# Patient Record
Sex: FEMALE | Race: WHITE
[De-identification: names, ages, dates, MRNs, and addresses within clinical notes are randomized per-mention and may not be internally consistent; named-entity substitution may affect disease eponyms.]

---

## 2020-06-12 ENCOUNTER — HOSPITAL ENCOUNTER (INPATIENT)
Dept: HOSPITAL 90 - EDH | Age: 75
LOS: 14 days | Discharge: NURSING, REHAB OR LONG-TERM CARE | DRG: 720 | End: 2020-06-26
Attending: INTERNAL MEDICINE | Admitting: INTERNAL MEDICINE
Payer: MEDICAID

## 2020-06-12 VITALS — DIASTOLIC BLOOD PRESSURE: 62 MMHG | SYSTOLIC BLOOD PRESSURE: 116 MMHG | HEART RATE: 58 BPM | RESPIRATION RATE: 18 BRPM

## 2020-06-12 VITALS — DIASTOLIC BLOOD PRESSURE: 69 MMHG | RESPIRATION RATE: 17 BRPM | HEART RATE: 75 BPM | SYSTOLIC BLOOD PRESSURE: 129 MMHG

## 2020-06-12 VITALS
DIASTOLIC BLOOD PRESSURE: 64 MMHG | HEART RATE: 61 BPM | RESPIRATION RATE: 18 BRPM | TEMPERATURE: 97.1 F | SYSTOLIC BLOOD PRESSURE: 105 MMHG

## 2020-06-12 VITALS — RESPIRATION RATE: 21 BRPM | DIASTOLIC BLOOD PRESSURE: 75 MMHG | SYSTOLIC BLOOD PRESSURE: 129 MMHG | HEART RATE: 75 BPM

## 2020-06-12 VITALS — RESPIRATION RATE: 11 BRPM | SYSTOLIC BLOOD PRESSURE: 129 MMHG | HEART RATE: 68 BPM | DIASTOLIC BLOOD PRESSURE: 65 MMHG

## 2020-06-12 VITALS — SYSTOLIC BLOOD PRESSURE: 109 MMHG | RESPIRATION RATE: 18 BRPM | DIASTOLIC BLOOD PRESSURE: 69 MMHG | HEART RATE: 71 BPM

## 2020-06-12 VITALS — SYSTOLIC BLOOD PRESSURE: 119 MMHG | DIASTOLIC BLOOD PRESSURE: 55 MMHG | RESPIRATION RATE: 15 BRPM | HEART RATE: 68 BPM

## 2020-06-12 VITALS — DIASTOLIC BLOOD PRESSURE: 58 MMHG | RESPIRATION RATE: 18 BRPM | HEART RATE: 73 BPM | SYSTOLIC BLOOD PRESSURE: 124 MMHG

## 2020-06-12 VITALS — RESPIRATION RATE: 18 BRPM | SYSTOLIC BLOOD PRESSURE: 127 MMHG | HEART RATE: 60 BPM | DIASTOLIC BLOOD PRESSURE: 67 MMHG

## 2020-06-12 VITALS — RESPIRATION RATE: 18 BRPM | DIASTOLIC BLOOD PRESSURE: 54 MMHG | HEART RATE: 75 BPM | SYSTOLIC BLOOD PRESSURE: 99 MMHG

## 2020-06-12 VITALS — RESPIRATION RATE: 21 BRPM | SYSTOLIC BLOOD PRESSURE: 135 MMHG | HEART RATE: 59 BPM | DIASTOLIC BLOOD PRESSURE: 61 MMHG

## 2020-06-12 VITALS — BODY MASS INDEX: 24.18 KG/M2 | HEIGHT: 65 IN | WEIGHT: 145.1 LBS

## 2020-06-12 VITALS — HEART RATE: 60 BPM | SYSTOLIC BLOOD PRESSURE: 121 MMHG | DIASTOLIC BLOOD PRESSURE: 71 MMHG | RESPIRATION RATE: 18 BRPM

## 2020-06-12 VITALS
TEMPERATURE: 97.5 F | SYSTOLIC BLOOD PRESSURE: 123 MMHG | DIASTOLIC BLOOD PRESSURE: 65 MMHG | HEART RATE: 67 BPM | RESPIRATION RATE: 21 BRPM

## 2020-06-12 VITALS — HEART RATE: 72 BPM

## 2020-06-12 VITALS — HEART RATE: 84 BPM | SYSTOLIC BLOOD PRESSURE: 204 MMHG | DIASTOLIC BLOOD PRESSURE: 91 MMHG | RESPIRATION RATE: 19 BRPM

## 2020-06-12 VITALS — RESPIRATION RATE: 18 BRPM | SYSTOLIC BLOOD PRESSURE: 93 MMHG | DIASTOLIC BLOOD PRESSURE: 50 MMHG | HEART RATE: 87 BPM

## 2020-06-12 VITALS — HEART RATE: 64 BPM | SYSTOLIC BLOOD PRESSURE: 122 MMHG | DIASTOLIC BLOOD PRESSURE: 63 MMHG | RESPIRATION RATE: 19 BRPM

## 2020-06-12 VITALS — DIASTOLIC BLOOD PRESSURE: 67 MMHG | SYSTOLIC BLOOD PRESSURE: 114 MMHG | HEART RATE: 70 BPM | RESPIRATION RATE: 18 BRPM

## 2020-06-12 VITALS — SYSTOLIC BLOOD PRESSURE: 117 MMHG | HEART RATE: 88 BPM | DIASTOLIC BLOOD PRESSURE: 48 MMHG | RESPIRATION RATE: 18 BRPM

## 2020-06-12 VITALS — HEART RATE: 64 BPM

## 2020-06-12 VITALS — DIASTOLIC BLOOD PRESSURE: 69 MMHG | RESPIRATION RATE: 18 BRPM | HEART RATE: 58 BPM | SYSTOLIC BLOOD PRESSURE: 128 MMHG

## 2020-06-12 VITALS — TEMPERATURE: 99.8 F

## 2020-06-12 VITALS — SYSTOLIC BLOOD PRESSURE: 101 MMHG | DIASTOLIC BLOOD PRESSURE: 54 MMHG | RESPIRATION RATE: 18 BRPM | HEART RATE: 71 BPM

## 2020-06-12 VITALS — HEART RATE: 73 BPM

## 2020-06-12 VITALS — DIASTOLIC BLOOD PRESSURE: 56 MMHG | HEART RATE: 69 BPM | SYSTOLIC BLOOD PRESSURE: 103 MMHG | RESPIRATION RATE: 18 BRPM

## 2020-06-12 VITALS — RESPIRATION RATE: 18 BRPM | HEART RATE: 67 BPM | DIASTOLIC BLOOD PRESSURE: 68 MMHG | SYSTOLIC BLOOD PRESSURE: 95 MMHG

## 2020-06-12 VITALS — RESPIRATION RATE: 21 BRPM | HEART RATE: 74 BPM

## 2020-06-12 VITALS — TEMPERATURE: 97.5 F

## 2020-06-12 VITALS — HEART RATE: 68 BPM | RESPIRATION RATE: 21 BRPM | SYSTOLIC BLOOD PRESSURE: 117 MMHG | DIASTOLIC BLOOD PRESSURE: 42 MMHG

## 2020-06-12 VITALS — TEMPERATURE: 99.5 F

## 2020-06-12 VITALS — HEART RATE: 78 BPM

## 2020-06-12 VITALS — HEART RATE: 71 BPM

## 2020-06-12 VITALS — HEART RATE: 65 BPM

## 2020-06-12 VITALS — HEART RATE: 70 BPM

## 2020-06-12 DIAGNOSIS — B96.89: ICD-10-CM

## 2020-06-12 DIAGNOSIS — R53.81: ICD-10-CM

## 2020-06-12 DIAGNOSIS — J93.9: ICD-10-CM

## 2020-06-12 DIAGNOSIS — Z91.048: ICD-10-CM

## 2020-06-12 DIAGNOSIS — N19: ICD-10-CM

## 2020-06-12 DIAGNOSIS — A41.9: Primary | ICD-10-CM

## 2020-06-12 DIAGNOSIS — Z88.1: ICD-10-CM

## 2020-06-12 DIAGNOSIS — Z20.828: ICD-10-CM

## 2020-06-12 DIAGNOSIS — E03.9: ICD-10-CM

## 2020-06-12 DIAGNOSIS — E87.3: ICD-10-CM

## 2020-06-12 DIAGNOSIS — K31.4: ICD-10-CM

## 2020-06-12 DIAGNOSIS — Z66: ICD-10-CM

## 2020-06-12 DIAGNOSIS — E87.6: ICD-10-CM

## 2020-06-12 DIAGNOSIS — E66.9: ICD-10-CM
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
## 2020-06-12 DIAGNOSIS — G93.1: ICD-10-CM

## 2020-06-12 DIAGNOSIS — J44.9: ICD-10-CM

## 2020-06-12 DIAGNOSIS — J69.0: ICD-10-CM

## 2020-06-12 DIAGNOSIS — M40.209: ICD-10-CM

## 2020-06-12 DIAGNOSIS — F34.1: ICD-10-CM

## 2020-06-12 DIAGNOSIS — E04.1: ICD-10-CM

## 2020-06-12 DIAGNOSIS — I11.0: ICD-10-CM

## 2020-06-12 DIAGNOSIS — E44.0: ICD-10-CM

## 2020-06-12 DIAGNOSIS — F03.90: ICD-10-CM

## 2020-06-12 DIAGNOSIS — R65.21: ICD-10-CM

## 2020-06-12 DIAGNOSIS — J96.22: ICD-10-CM

## 2020-06-12 DIAGNOSIS — N39.0: ICD-10-CM

## 2020-06-12 DIAGNOSIS — D64.9: ICD-10-CM

## 2020-06-12 DIAGNOSIS — T79.7XXA: ICD-10-CM

## 2020-06-12 DIAGNOSIS — Z79.899: ICD-10-CM

## 2020-06-12 DIAGNOSIS — I50.33: ICD-10-CM

## 2020-06-12 DIAGNOSIS — Z88.8: ICD-10-CM

## 2020-06-12 DIAGNOSIS — D72.823: ICD-10-CM

## 2020-06-12 DIAGNOSIS — J96.21: ICD-10-CM

## 2020-06-12 DIAGNOSIS — Z74.01: ICD-10-CM

## 2020-06-12 DIAGNOSIS — E87.0: ICD-10-CM

## 2020-06-12 DIAGNOSIS — I46.9: ICD-10-CM

## 2020-06-12 DIAGNOSIS — G47.00: ICD-10-CM

## 2020-06-12 PROCEDURE — 5A1955Z RESPIRATORY VENTILATION, GREATER THAN 96 CONSECUTIVE HOURS: ICD-10-PCS | Performed by: INTERNAL MEDICINE

## 2020-06-12 PROCEDURE — 0W9930Z DRAINAGE OF RIGHT PLEURAL CAVITY WITH DRAINAGE DEVICE, PERCUTANEOUS APPROACH: ICD-10-PCS | Performed by: INTERNAL MEDICINE

## 2020-06-12 PROCEDURE — 5A12012 PERFORMANCE OF CARDIAC OUTPUT, SINGLE, MANUAL: ICD-10-PCS | Performed by: INTERNAL MEDICINE

## 2020-06-12 PROCEDURE — 0BH17EZ INSERTION OF ENDOTRACHEAL AIRWAY INTO TRACHEA, VIA NATURAL OR ARTIFICIAL OPENING: ICD-10-PCS | Performed by: INTERNAL MEDICINE

## 2020-06-12 RX ADMIN — PIPERACILLIN SODIUM AND TAZOBACTAM SODIUM SCH MLS/HR: .375; 3 INJECTION, POWDER, LYOPHILIZED, FOR SOLUTION INTRAVENOUS at 18:12

## 2020-06-12 RX ADMIN — FAMOTIDINE SCH MG: 10 INJECTION INTRAVENOUS at 22:10

## 2020-06-12 RX ADMIN — Medication PRN MG: at 19:35

## 2020-06-12 RX ADMIN — SODIUM CHLORIDE SCH MLS/HR: 0.9 INJECTION, SOLUTION INTRAVENOUS at 10:12

## 2020-06-12 RX ADMIN — VANCOMYCIN HYDROCHLORIDE SCH MLS/HR: 1 INJECTION, POWDER, LYOPHILIZED, FOR SOLUTION INTRAVENOUS at 23:07

## 2020-06-12 RX ADMIN — SODIUM CHLORIDE SCH UNIT: 9 INJECTION, SOLUTION INTRAVENOUS at 20:43

## 2020-06-12 RX ADMIN — PIPERACILLIN SODIUM AND TAZOBACTAM SODIUM SCH MLS/HR: .375; 3 INJECTION, POWDER, LYOPHILIZED, FOR SOLUTION INTRAVENOUS at 10:12

## 2020-06-12 RX ADMIN — HEPARIN SODIUM SCH UNIT: 5000 INJECTION, SOLUTION INTRAVENOUS; SUBCUTANEOUS at 22:11

## 2020-06-12 RX ADMIN — HEPARIN SODIUM SCH UNIT: 5000 INJECTION, SOLUTION INTRAVENOUS; SUBCUTANEOUS at 10:13

## 2020-06-12 RX ADMIN — VANCOMYCIN HYDROCHLORIDE SCH MLS/HR: 1 INJECTION, POWDER, LYOPHILIZED, FOR SOLUTION INTRAVENOUS at 15:05

## 2020-06-12 RX ADMIN — SODIUM CHLORIDE SCH UNIT: 9 INJECTION, SOLUTION INTRAVENOUS at 16:30

## 2020-06-12 NOTE — NUR
PATIENT HAS ARRIVED FROM ER DEPARTMENT. PATIENT INTUBATED, HAMILTON CATHETER INTACT, LEFT AND 
RIGHT CHEST TUBES INTACT. PATIENT SEDATED ON FENTANYL AND PROPOFOL IV DRIPS. PATIENT 
RESPONDS TO PAINFUL STIMULATION.

## 2020-06-13 VITALS — TEMPERATURE: 99.8 F

## 2020-06-13 VITALS
DIASTOLIC BLOOD PRESSURE: 59 MMHG | TEMPERATURE: 99.7 F | SYSTOLIC BLOOD PRESSURE: 113 MMHG | RESPIRATION RATE: 18 BRPM | HEART RATE: 88 BPM

## 2020-06-13 VITALS — SYSTOLIC BLOOD PRESSURE: 110 MMHG | DIASTOLIC BLOOD PRESSURE: 54 MMHG | RESPIRATION RATE: 18 BRPM | HEART RATE: 92 BPM

## 2020-06-13 VITALS — SYSTOLIC BLOOD PRESSURE: 100 MMHG | RESPIRATION RATE: 18 BRPM | HEART RATE: 81 BPM | DIASTOLIC BLOOD PRESSURE: 58 MMHG

## 2020-06-13 VITALS — RESPIRATION RATE: 14 BRPM | HEART RATE: 73 BPM

## 2020-06-13 VITALS — RESPIRATION RATE: 18 BRPM | SYSTOLIC BLOOD PRESSURE: 103 MMHG | DIASTOLIC BLOOD PRESSURE: 53 MMHG | HEART RATE: 93 BPM

## 2020-06-13 VITALS — HEART RATE: 102 BPM | SYSTOLIC BLOOD PRESSURE: 104 MMHG | DIASTOLIC BLOOD PRESSURE: 70 MMHG | RESPIRATION RATE: 30 BRPM

## 2020-06-13 VITALS — HEART RATE: 77 BPM

## 2020-06-13 VITALS — DIASTOLIC BLOOD PRESSURE: 49 MMHG | SYSTOLIC BLOOD PRESSURE: 115 MMHG | HEART RATE: 76 BPM

## 2020-06-13 VITALS — RESPIRATION RATE: 14 BRPM | HEART RATE: 98 BPM | SYSTOLIC BLOOD PRESSURE: 111 MMHG | DIASTOLIC BLOOD PRESSURE: 53 MMHG

## 2020-06-13 VITALS — RESPIRATION RATE: 16 BRPM | SYSTOLIC BLOOD PRESSURE: 113 MMHG | DIASTOLIC BLOOD PRESSURE: 46 MMHG | HEART RATE: 91 BPM

## 2020-06-13 VITALS — SYSTOLIC BLOOD PRESSURE: 116 MMHG | HEART RATE: 77 BPM | RESPIRATION RATE: 14 BRPM | DIASTOLIC BLOOD PRESSURE: 59 MMHG

## 2020-06-13 VITALS — DIASTOLIC BLOOD PRESSURE: 59 MMHG | HEART RATE: 91 BPM | RESPIRATION RATE: 15 BRPM | SYSTOLIC BLOOD PRESSURE: 109 MMHG

## 2020-06-13 VITALS — HEART RATE: 86 BPM | SYSTOLIC BLOOD PRESSURE: 97 MMHG | RESPIRATION RATE: 14 BRPM | DIASTOLIC BLOOD PRESSURE: 58 MMHG

## 2020-06-13 VITALS — RESPIRATION RATE: 18 BRPM | HEART RATE: 88 BPM

## 2020-06-13 VITALS — HEART RATE: 81 BPM | RESPIRATION RATE: 18 BRPM | SYSTOLIC BLOOD PRESSURE: 99 MMHG | DIASTOLIC BLOOD PRESSURE: 52 MMHG

## 2020-06-13 VITALS — SYSTOLIC BLOOD PRESSURE: 94 MMHG | DIASTOLIC BLOOD PRESSURE: 61 MMHG | RESPIRATION RATE: 18 BRPM | HEART RATE: 72 BPM

## 2020-06-13 VITALS — DIASTOLIC BLOOD PRESSURE: 60 MMHG | RESPIRATION RATE: 14 BRPM | HEART RATE: 85 BPM | SYSTOLIC BLOOD PRESSURE: 104 MMHG

## 2020-06-13 VITALS — DIASTOLIC BLOOD PRESSURE: 52 MMHG | HEART RATE: 88 BPM | SYSTOLIC BLOOD PRESSURE: 93 MMHG | RESPIRATION RATE: 14 BRPM

## 2020-06-13 VITALS — SYSTOLIC BLOOD PRESSURE: 103 MMHG | DIASTOLIC BLOOD PRESSURE: 47 MMHG | HEART RATE: 77 BPM | RESPIRATION RATE: 14 BRPM

## 2020-06-13 VITALS — HEART RATE: 75 BPM | RESPIRATION RATE: 14 BRPM

## 2020-06-13 VITALS — SYSTOLIC BLOOD PRESSURE: 121 MMHG | RESPIRATION RATE: 16 BRPM | HEART RATE: 77 BPM | DIASTOLIC BLOOD PRESSURE: 51 MMHG

## 2020-06-13 VITALS — DIASTOLIC BLOOD PRESSURE: 66 MMHG | HEART RATE: 84 BPM | RESPIRATION RATE: 16 BRPM | SYSTOLIC BLOOD PRESSURE: 116 MMHG

## 2020-06-13 VITALS — SYSTOLIC BLOOD PRESSURE: 109 MMHG | DIASTOLIC BLOOD PRESSURE: 57 MMHG | RESPIRATION RATE: 14 BRPM | HEART RATE: 75 BPM

## 2020-06-13 VITALS — SYSTOLIC BLOOD PRESSURE: 115 MMHG | DIASTOLIC BLOOD PRESSURE: 60 MMHG | RESPIRATION RATE: 14 BRPM | HEART RATE: 95 BPM

## 2020-06-13 VITALS — SYSTOLIC BLOOD PRESSURE: 111 MMHG | HEART RATE: 81 BPM | RESPIRATION RATE: 13 BRPM | DIASTOLIC BLOOD PRESSURE: 46 MMHG

## 2020-06-13 VITALS — SYSTOLIC BLOOD PRESSURE: 114 MMHG | HEART RATE: 105 BPM | RESPIRATION RATE: 15 BRPM | DIASTOLIC BLOOD PRESSURE: 85 MMHG

## 2020-06-13 VITALS — SYSTOLIC BLOOD PRESSURE: 116 MMHG | HEART RATE: 91 BPM | DIASTOLIC BLOOD PRESSURE: 63 MMHG | RESPIRATION RATE: 18 BRPM

## 2020-06-13 VITALS — HEART RATE: 89 BPM | DIASTOLIC BLOOD PRESSURE: 52 MMHG | RESPIRATION RATE: 14 BRPM | SYSTOLIC BLOOD PRESSURE: 91 MMHG

## 2020-06-13 VITALS — HEART RATE: 78 BPM

## 2020-06-13 VITALS — HEART RATE: 86 BPM | DIASTOLIC BLOOD PRESSURE: 53 MMHG | SYSTOLIC BLOOD PRESSURE: 94 MMHG | RESPIRATION RATE: 14 BRPM

## 2020-06-13 VITALS — HEART RATE: 90 BPM

## 2020-06-13 VITALS — RESPIRATION RATE: 14 BRPM | SYSTOLIC BLOOD PRESSURE: 115 MMHG | DIASTOLIC BLOOD PRESSURE: 63 MMHG | HEART RATE: 80 BPM

## 2020-06-13 VITALS — HEART RATE: 84 BPM

## 2020-06-13 VITALS — HEART RATE: 89 BPM

## 2020-06-13 VITALS — HEART RATE: 77 BPM | SYSTOLIC BLOOD PRESSURE: 96 MMHG | DIASTOLIC BLOOD PRESSURE: 46 MMHG

## 2020-06-13 VITALS — HEART RATE: 77 BPM | RESPIRATION RATE: 11 BRPM | SYSTOLIC BLOOD PRESSURE: 130 MMHG | DIASTOLIC BLOOD PRESSURE: 53 MMHG

## 2020-06-13 VITALS — DIASTOLIC BLOOD PRESSURE: 63 MMHG | HEART RATE: 77 BPM | RESPIRATION RATE: 15 BRPM | SYSTOLIC BLOOD PRESSURE: 121 MMHG

## 2020-06-13 VITALS — HEART RATE: 76 BPM | DIASTOLIC BLOOD PRESSURE: 44 MMHG | RESPIRATION RATE: 14 BRPM | SYSTOLIC BLOOD PRESSURE: 99 MMHG

## 2020-06-13 VITALS — HEART RATE: 83 BPM

## 2020-06-13 VITALS — HEART RATE: 97 BPM | DIASTOLIC BLOOD PRESSURE: 49 MMHG | SYSTOLIC BLOOD PRESSURE: 101 MMHG

## 2020-06-13 VITALS — HEART RATE: 81 BPM

## 2020-06-13 VITALS — RESPIRATION RATE: 18 BRPM | SYSTOLIC BLOOD PRESSURE: 102 MMHG | HEART RATE: 80 BPM | DIASTOLIC BLOOD PRESSURE: 57 MMHG

## 2020-06-13 VITALS — TEMPERATURE: 97.7 F

## 2020-06-13 VITALS — DIASTOLIC BLOOD PRESSURE: 65 MMHG | HEART RATE: 88 BPM | SYSTOLIC BLOOD PRESSURE: 113 MMHG | RESPIRATION RATE: 18 BRPM

## 2020-06-13 VITALS — DIASTOLIC BLOOD PRESSURE: 47 MMHG | SYSTOLIC BLOOD PRESSURE: 95 MMHG | HEART RATE: 89 BPM | RESPIRATION RATE: 18 BRPM

## 2020-06-13 VITALS — RESPIRATION RATE: 14 BRPM | HEART RATE: 79 BPM

## 2020-06-13 VITALS — HEART RATE: 86 BPM

## 2020-06-13 VITALS — TEMPERATURE: 98.5 F

## 2020-06-13 VITALS — TEMPERATURE: 98.4 F

## 2020-06-13 VITALS — HEART RATE: 74 BPM

## 2020-06-13 VITALS — TEMPERATURE: 99.1 F

## 2020-06-13 VITALS — HEART RATE: 79 BPM

## 2020-06-13 RX ADMIN — IPRATROPIUM BROMIDE AND ALBUTEROL SULFATE PRN UDVIAL: .5; 3 SOLUTION RESPIRATORY (INHALATION) at 06:32

## 2020-06-13 RX ADMIN — Medication PRN MG: at 18:36

## 2020-06-13 RX ADMIN — PIPERACILLIN SODIUM AND TAZOBACTAM SODIUM SCH MLS/HR: .375; 3 INJECTION, POWDER, LYOPHILIZED, FOR SOLUTION INTRAVENOUS at 02:21

## 2020-06-13 RX ADMIN — SODIUM CHLORIDE SCH UNIT: 9 INJECTION, SOLUTION INTRAVENOUS at 19:43

## 2020-06-13 RX ADMIN — IPRATROPIUM BROMIDE AND ALBUTEROL SULFATE PRN UDVIAL: .5; 3 SOLUTION RESPIRATORY (INHALATION) at 18:39

## 2020-06-13 RX ADMIN — SODIUM CHLORIDE SCH MLS/HR: 0.9 INJECTION, SOLUTION INTRAVENOUS at 18:36

## 2020-06-13 RX ADMIN — HEPARIN SODIUM SCH UNIT: 5000 INJECTION, SOLUTION INTRAVENOUS; SUBCUTANEOUS at 21:32

## 2020-06-13 RX ADMIN — PIPERACILLIN SODIUM AND TAZOBACTAM SODIUM SCH MLS/HR: .375; 3 INJECTION, POWDER, LYOPHILIZED, FOR SOLUTION INTRAVENOUS at 10:15

## 2020-06-13 RX ADMIN — SODIUM CHLORIDE SCH UNIT: 9 INJECTION, SOLUTION INTRAVENOUS at 16:30

## 2020-06-13 RX ADMIN — SODIUM CHLORIDE SCH MLS/HR: 0.9 INJECTION, SOLUTION INTRAVENOUS at 06:15

## 2020-06-13 RX ADMIN — HEPARIN SODIUM SCH UNIT: 5000 INJECTION, SOLUTION INTRAVENOUS; SUBCUTANEOUS at 10:16

## 2020-06-13 RX ADMIN — FAMOTIDINE SCH MG: 10 INJECTION INTRAVENOUS at 21:30

## 2020-06-13 RX ADMIN — SODIUM CHLORIDE SCH UNIT: 9 INJECTION, SOLUTION INTRAVENOUS at 11:01

## 2020-06-13 RX ADMIN — FAMOTIDINE SCH MG: 10 INJECTION INTRAVENOUS at 08:38

## 2020-06-13 RX ADMIN — VANCOMYCIN HYDROCHLORIDE SCH MLS/HR: 1 INJECTION, POWDER, LYOPHILIZED, FOR SOLUTION INTRAVENOUS at 11:43

## 2020-06-13 RX ADMIN — PIPERACILLIN SODIUM AND TAZOBACTAM SODIUM SCH MLS/HR: .375; 3 INJECTION, POWDER, LYOPHILIZED, FOR SOLUTION INTRAVENOUS at 17:42

## 2020-06-13 RX ADMIN — IPRATROPIUM BROMIDE AND ALBUTEROL SULFATE PRN UDVIAL: .5; 3 SOLUTION RESPIRATORY (INHALATION) at 11:31

## 2020-06-13 RX ADMIN — SODIUM CHLORIDE SCH UNIT: 9 INJECTION, SOLUTION INTRAVENOUS at 05:58

## 2020-06-13 RX ADMIN — METHYLPREDNISOLONE SODIUM SUCCINATE SCH MG: 40 INJECTION, POWDER, FOR SOLUTION INTRAMUSCULAR; INTRAVENOUS at 08:38

## 2020-06-13 RX ADMIN — IPRATROPIUM BROMIDE AND ALBUTEROL SULFATE PRN UDVIAL: .5; 3 SOLUTION RESPIRATORY (INHALATION) at 18:34

## 2020-06-13 NOTE — NUR
Patient Power of  alejandrina attempted to be contacted

Made 16 attempts to contact family regarding central line placement as patient has poor 
peripheral IV access. Dr. Westbrook at bedside went ahead with emergency central line access 
as family did not answer phone and patient was in need of iv access for sedation, fluids and 
antibiotics.

## 2020-06-14 VITALS — RESPIRATION RATE: 15 BRPM | DIASTOLIC BLOOD PRESSURE: 52 MMHG | SYSTOLIC BLOOD PRESSURE: 124 MMHG | HEART RATE: 79 BPM

## 2020-06-14 VITALS
DIASTOLIC BLOOD PRESSURE: 52 MMHG | RESPIRATION RATE: 11 BRPM | HEART RATE: 80 BPM | TEMPERATURE: 98.6 F | SYSTOLIC BLOOD PRESSURE: 129 MMHG

## 2020-06-14 VITALS — SYSTOLIC BLOOD PRESSURE: 90 MMHG | HEART RATE: 69 BPM | DIASTOLIC BLOOD PRESSURE: 31 MMHG | RESPIRATION RATE: 21 BRPM

## 2020-06-14 VITALS — HEART RATE: 84 BPM

## 2020-06-14 VITALS — HEART RATE: 64 BPM

## 2020-06-14 VITALS — HEART RATE: 65 BPM | RESPIRATION RATE: 16 BRPM | SYSTOLIC BLOOD PRESSURE: 111 MMHG | DIASTOLIC BLOOD PRESSURE: 57 MMHG

## 2020-06-14 VITALS — DIASTOLIC BLOOD PRESSURE: 72 MMHG | RESPIRATION RATE: 15 BRPM | HEART RATE: 78 BPM | SYSTOLIC BLOOD PRESSURE: 113 MMHG

## 2020-06-14 VITALS — RESPIRATION RATE: 14 BRPM | HEART RATE: 84 BPM | SYSTOLIC BLOOD PRESSURE: 124 MMHG | DIASTOLIC BLOOD PRESSURE: 63 MMHG

## 2020-06-14 VITALS
DIASTOLIC BLOOD PRESSURE: 53 MMHG | TEMPERATURE: 98.2 F | SYSTOLIC BLOOD PRESSURE: 110 MMHG | HEART RATE: 69 BPM | RESPIRATION RATE: 16 BRPM

## 2020-06-14 VITALS — DIASTOLIC BLOOD PRESSURE: 51 MMHG | RESPIRATION RATE: 14 BRPM | SYSTOLIC BLOOD PRESSURE: 108 MMHG | HEART RATE: 65 BPM

## 2020-06-14 VITALS — RESPIRATION RATE: 22 BRPM | HEART RATE: 64 BPM | DIASTOLIC BLOOD PRESSURE: 57 MMHG | SYSTOLIC BLOOD PRESSURE: 116 MMHG

## 2020-06-14 VITALS — HEART RATE: 68 BPM

## 2020-06-14 VITALS — HEART RATE: 65 BPM | SYSTOLIC BLOOD PRESSURE: 125 MMHG | DIASTOLIC BLOOD PRESSURE: 59 MMHG | RESPIRATION RATE: 15 BRPM

## 2020-06-14 VITALS — DIASTOLIC BLOOD PRESSURE: 80 MMHG | SYSTOLIC BLOOD PRESSURE: 121 MMHG | HEART RATE: 78 BPM | RESPIRATION RATE: 20 BRPM

## 2020-06-14 VITALS — HEART RATE: 71 BPM

## 2020-06-14 VITALS — DIASTOLIC BLOOD PRESSURE: 50 MMHG | SYSTOLIC BLOOD PRESSURE: 127 MMHG | HEART RATE: 82 BPM | RESPIRATION RATE: 13 BRPM

## 2020-06-14 VITALS — SYSTOLIC BLOOD PRESSURE: 120 MMHG | RESPIRATION RATE: 15 BRPM | DIASTOLIC BLOOD PRESSURE: 48 MMHG | HEART RATE: 73 BPM

## 2020-06-14 VITALS — SYSTOLIC BLOOD PRESSURE: 131 MMHG | HEART RATE: 86 BPM | RESPIRATION RATE: 14 BRPM | DIASTOLIC BLOOD PRESSURE: 61 MMHG

## 2020-06-14 VITALS — SYSTOLIC BLOOD PRESSURE: 138 MMHG | HEART RATE: 65 BPM | DIASTOLIC BLOOD PRESSURE: 64 MMHG | RESPIRATION RATE: 16 BRPM

## 2020-06-14 VITALS — HEART RATE: 67 BPM | SYSTOLIC BLOOD PRESSURE: 126 MMHG | DIASTOLIC BLOOD PRESSURE: 62 MMHG | RESPIRATION RATE: 14 BRPM

## 2020-06-14 VITALS — DIASTOLIC BLOOD PRESSURE: 54 MMHG | HEART RATE: 77 BPM | SYSTOLIC BLOOD PRESSURE: 119 MMHG | RESPIRATION RATE: 13 BRPM

## 2020-06-14 VITALS — HEART RATE: 78 BPM

## 2020-06-14 VITALS — RESPIRATION RATE: 11 BRPM | SYSTOLIC BLOOD PRESSURE: 111 MMHG | HEART RATE: 72 BPM | DIASTOLIC BLOOD PRESSURE: 49 MMHG

## 2020-06-14 VITALS — HEART RATE: 69 BPM | RESPIRATION RATE: 28 BRPM | SYSTOLIC BLOOD PRESSURE: 109 MMHG | DIASTOLIC BLOOD PRESSURE: 49 MMHG

## 2020-06-14 VITALS — RESPIRATION RATE: 15 BRPM | HEART RATE: 74 BPM | SYSTOLIC BLOOD PRESSURE: 114 MMHG | DIASTOLIC BLOOD PRESSURE: 56 MMHG

## 2020-06-14 VITALS — SYSTOLIC BLOOD PRESSURE: 112 MMHG | RESPIRATION RATE: 16 BRPM | DIASTOLIC BLOOD PRESSURE: 59 MMHG | HEART RATE: 68 BPM

## 2020-06-14 VITALS — HEART RATE: 75 BPM

## 2020-06-14 VITALS — TEMPERATURE: 97.7 F

## 2020-06-14 VITALS — RESPIRATION RATE: 14 BRPM | HEART RATE: 67 BPM | SYSTOLIC BLOOD PRESSURE: 123 MMHG | DIASTOLIC BLOOD PRESSURE: 54 MMHG

## 2020-06-14 VITALS — SYSTOLIC BLOOD PRESSURE: 113 MMHG | HEART RATE: 73 BPM | RESPIRATION RATE: 24 BRPM | DIASTOLIC BLOOD PRESSURE: 48 MMHG

## 2020-06-14 VITALS — RESPIRATION RATE: 14 BRPM | HEART RATE: 65 BPM

## 2020-06-14 VITALS — HEART RATE: 62 BPM | DIASTOLIC BLOOD PRESSURE: 64 MMHG | RESPIRATION RATE: 16 BRPM | SYSTOLIC BLOOD PRESSURE: 123 MMHG

## 2020-06-14 VITALS — HEART RATE: 69 BPM

## 2020-06-14 VITALS — HEART RATE: 76 BPM

## 2020-06-14 VITALS — TEMPERATURE: 98.4 F

## 2020-06-14 VITALS — HEART RATE: 66 BPM

## 2020-06-14 VITALS — RESPIRATION RATE: 15 BRPM | HEART RATE: 84 BPM

## 2020-06-14 VITALS — TEMPERATURE: 97.9 F

## 2020-06-14 VITALS — TEMPERATURE: 98.8 F

## 2020-06-14 RX ADMIN — PIPERACILLIN SODIUM AND TAZOBACTAM SODIUM SCH MLS/HR: .375; 3 INJECTION, POWDER, LYOPHILIZED, FOR SOLUTION INTRAVENOUS at 18:26

## 2020-06-14 RX ADMIN — SODIUM CHLORIDE SCH UNIT: 9 INJECTION, SOLUTION INTRAVENOUS at 16:30

## 2020-06-14 RX ADMIN — HEPARIN SODIUM SCH UNIT: 5000 INJECTION, SOLUTION INTRAVENOUS; SUBCUTANEOUS at 20:51

## 2020-06-14 RX ADMIN — FAMOTIDINE SCH MG: 10 INJECTION INTRAVENOUS at 19:58

## 2020-06-14 RX ADMIN — SODIUM CHLORIDE SCH UNIT: 9 INJECTION, SOLUTION INTRAVENOUS at 05:12

## 2020-06-14 RX ADMIN — IPRATROPIUM BROMIDE AND ALBUTEROL SULFATE PRN UDVIAL: .5; 3 SOLUTION RESPIRATORY (INHALATION) at 00:24

## 2020-06-14 RX ADMIN — SODIUM CHLORIDE SCH UNIT: 9 INJECTION, SOLUTION INTRAVENOUS at 20:36

## 2020-06-14 RX ADMIN — SODIUM CHLORIDE SCH UNIT: 9 INJECTION, SOLUTION INTRAVENOUS at 11:30

## 2020-06-14 RX ADMIN — FAMOTIDINE SCH MG: 10 INJECTION INTRAVENOUS at 07:37

## 2020-06-14 RX ADMIN — PIPERACILLIN SODIUM AND TAZOBACTAM SODIUM SCH MLS/HR: .375; 3 INJECTION, POWDER, LYOPHILIZED, FOR SOLUTION INTRAVENOUS at 02:26

## 2020-06-14 RX ADMIN — FUROSEMIDE SCH MG: 10 INJECTION INTRAVENOUS at 18:13

## 2020-06-14 RX ADMIN — HEPARIN SODIUM SCH UNIT: 5000 INJECTION, SOLUTION INTRAVENOUS; SUBCUTANEOUS at 10:15

## 2020-06-14 RX ADMIN — METHYLPREDNISOLONE SODIUM SUCCINATE SCH MG: 40 INJECTION, POWDER, FOR SOLUTION INTRAMUSCULAR; INTRAVENOUS at 07:37

## 2020-06-14 RX ADMIN — PIPERACILLIN SODIUM AND TAZOBACTAM SODIUM SCH MLS/HR: .375; 3 INJECTION, POWDER, LYOPHILIZED, FOR SOLUTION INTRAVENOUS at 10:12

## 2020-06-14 RX ADMIN — VANCOMYCIN HYDROCHLORIDE SCH MLS/HR: 1 INJECTION, POWDER, LYOPHILIZED, FOR SOLUTION INTRAVENOUS at 00:00

## 2020-06-14 NOTE — NUR
D/C PLAN

CM attempted to call kristynkeeley Jacome Cedroberto to discuss d/c planning. No answer. CM reviewed 
medical record. Patient is from [facility]. Pt currently vented and DNR status. CM 
to f/u. Possible hospice candidate if no improvement. 

-------------------------------------------------------------------------------

Addendum: 06/14/20 at 1927 by ELOY CROWDER CM

-------------------------------------------------------------------------------

Amended: Links added.

## 2020-06-15 VITALS — HEART RATE: 58 BPM | RESPIRATION RATE: 14 BRPM | SYSTOLIC BLOOD PRESSURE: 121 MMHG | DIASTOLIC BLOOD PRESSURE: 50 MMHG

## 2020-06-15 VITALS — SYSTOLIC BLOOD PRESSURE: 119 MMHG | DIASTOLIC BLOOD PRESSURE: 61 MMHG | RESPIRATION RATE: 14 BRPM | HEART RATE: 69 BPM

## 2020-06-15 VITALS — SYSTOLIC BLOOD PRESSURE: 123 MMHG | RESPIRATION RATE: 14 BRPM | HEART RATE: 63 BPM | DIASTOLIC BLOOD PRESSURE: 56 MMHG

## 2020-06-15 VITALS — DIASTOLIC BLOOD PRESSURE: 56 MMHG | SYSTOLIC BLOOD PRESSURE: 129 MMHG | HEART RATE: 93 BPM | RESPIRATION RATE: 21 BRPM

## 2020-06-15 VITALS — HEART RATE: 90 BPM | DIASTOLIC BLOOD PRESSURE: 45 MMHG | RESPIRATION RATE: 16 BRPM | SYSTOLIC BLOOD PRESSURE: 108 MMHG

## 2020-06-15 VITALS — TEMPERATURE: 99.3 F

## 2020-06-15 VITALS — SYSTOLIC BLOOD PRESSURE: 108 MMHG | HEART RATE: 66 BPM | DIASTOLIC BLOOD PRESSURE: 48 MMHG | RESPIRATION RATE: 14 BRPM

## 2020-06-15 VITALS — HEART RATE: 66 BPM | SYSTOLIC BLOOD PRESSURE: 111 MMHG | RESPIRATION RATE: 21 BRPM | DIASTOLIC BLOOD PRESSURE: 50 MMHG

## 2020-06-15 VITALS — HEART RATE: 72 BPM | SYSTOLIC BLOOD PRESSURE: 126 MMHG | RESPIRATION RATE: 14 BRPM | DIASTOLIC BLOOD PRESSURE: 55 MMHG

## 2020-06-15 VITALS — SYSTOLIC BLOOD PRESSURE: 100 MMHG | HEART RATE: 66 BPM | RESPIRATION RATE: 19 BRPM | DIASTOLIC BLOOD PRESSURE: 46 MMHG

## 2020-06-15 VITALS — DIASTOLIC BLOOD PRESSURE: 49 MMHG | HEART RATE: 79 BPM | SYSTOLIC BLOOD PRESSURE: 119 MMHG | RESPIRATION RATE: 19 BRPM

## 2020-06-15 VITALS — DIASTOLIC BLOOD PRESSURE: 49 MMHG | SYSTOLIC BLOOD PRESSURE: 119 MMHG | HEART RATE: 68 BPM | RESPIRATION RATE: 16 BRPM

## 2020-06-15 VITALS — HEART RATE: 57 BPM | SYSTOLIC BLOOD PRESSURE: 123 MMHG | DIASTOLIC BLOOD PRESSURE: 53 MMHG | RESPIRATION RATE: 14 BRPM

## 2020-06-15 VITALS — DIASTOLIC BLOOD PRESSURE: 52 MMHG | RESPIRATION RATE: 14 BRPM | HEART RATE: 60 BPM | SYSTOLIC BLOOD PRESSURE: 117 MMHG

## 2020-06-15 VITALS — SYSTOLIC BLOOD PRESSURE: 111 MMHG | RESPIRATION RATE: 14 BRPM | HEART RATE: 61 BPM | DIASTOLIC BLOOD PRESSURE: 48 MMHG

## 2020-06-15 VITALS — SYSTOLIC BLOOD PRESSURE: 122 MMHG | DIASTOLIC BLOOD PRESSURE: 60 MMHG | HEART RATE: 75 BPM | RESPIRATION RATE: 22 BRPM

## 2020-06-15 VITALS — HEART RATE: 75 BPM

## 2020-06-15 VITALS — RESPIRATION RATE: 21 BRPM | DIASTOLIC BLOOD PRESSURE: 49 MMHG | HEART RATE: 79 BPM | SYSTOLIC BLOOD PRESSURE: 113 MMHG

## 2020-06-15 VITALS — HEART RATE: 86 BPM

## 2020-06-15 VITALS — SYSTOLIC BLOOD PRESSURE: 122 MMHG | RESPIRATION RATE: 14 BRPM | DIASTOLIC BLOOD PRESSURE: 45 MMHG | HEART RATE: 63 BPM

## 2020-06-15 VITALS — HEART RATE: 76 BPM | DIASTOLIC BLOOD PRESSURE: 60 MMHG | RESPIRATION RATE: 21 BRPM | SYSTOLIC BLOOD PRESSURE: 110 MMHG

## 2020-06-15 VITALS — RESPIRATION RATE: 14 BRPM | SYSTOLIC BLOOD PRESSURE: 112 MMHG | HEART RATE: 72 BPM | DIASTOLIC BLOOD PRESSURE: 54 MMHG

## 2020-06-15 VITALS — DIASTOLIC BLOOD PRESSURE: 44 MMHG | SYSTOLIC BLOOD PRESSURE: 110 MMHG | RESPIRATION RATE: 14 BRPM | HEART RATE: 78 BPM

## 2020-06-15 VITALS — DIASTOLIC BLOOD PRESSURE: 49 MMHG | SYSTOLIC BLOOD PRESSURE: 113 MMHG | RESPIRATION RATE: 14 BRPM | HEART RATE: 85 BPM

## 2020-06-15 VITALS — HEART RATE: 83 BPM

## 2020-06-15 VITALS — SYSTOLIC BLOOD PRESSURE: 126 MMHG | HEART RATE: 88 BPM | DIASTOLIC BLOOD PRESSURE: 55 MMHG | RESPIRATION RATE: 18 BRPM

## 2020-06-15 VITALS — TEMPERATURE: 100.2 F

## 2020-06-15 VITALS — HEART RATE: 75 BPM | DIASTOLIC BLOOD PRESSURE: 58 MMHG | SYSTOLIC BLOOD PRESSURE: 115 MMHG | RESPIRATION RATE: 15 BRPM

## 2020-06-15 VITALS — DIASTOLIC BLOOD PRESSURE: 47 MMHG | HEART RATE: 76 BPM | SYSTOLIC BLOOD PRESSURE: 114 MMHG | RESPIRATION RATE: 20 BRPM

## 2020-06-15 VITALS — HEART RATE: 61 BPM

## 2020-06-15 VITALS — HEART RATE: 67 BPM

## 2020-06-15 VITALS — DIASTOLIC BLOOD PRESSURE: 46 MMHG | SYSTOLIC BLOOD PRESSURE: 100 MMHG | HEART RATE: 64 BPM | RESPIRATION RATE: 14 BRPM

## 2020-06-15 VITALS — RESPIRATION RATE: 14 BRPM | SYSTOLIC BLOOD PRESSURE: 106 MMHG | DIASTOLIC BLOOD PRESSURE: 45 MMHG | HEART RATE: 69 BPM

## 2020-06-15 VITALS — RESPIRATION RATE: 14 BRPM | SYSTOLIC BLOOD PRESSURE: 127 MMHG | HEART RATE: 80 BPM | DIASTOLIC BLOOD PRESSURE: 59 MMHG

## 2020-06-15 VITALS — RESPIRATION RATE: 18 BRPM | DIASTOLIC BLOOD PRESSURE: 58 MMHG | HEART RATE: 81 BPM | SYSTOLIC BLOOD PRESSURE: 121 MMHG

## 2020-06-15 VITALS — DIASTOLIC BLOOD PRESSURE: 52 MMHG | SYSTOLIC BLOOD PRESSURE: 119 MMHG | RESPIRATION RATE: 14 BRPM | HEART RATE: 63 BPM

## 2020-06-15 VITALS — DIASTOLIC BLOOD PRESSURE: 53 MMHG | HEART RATE: 59 BPM | SYSTOLIC BLOOD PRESSURE: 118 MMHG | RESPIRATION RATE: 14 BRPM

## 2020-06-15 VITALS — HEART RATE: 66 BPM | DIASTOLIC BLOOD PRESSURE: 48 MMHG | SYSTOLIC BLOOD PRESSURE: 105 MMHG | RESPIRATION RATE: 14 BRPM

## 2020-06-15 VITALS — RESPIRATION RATE: 23 BRPM | HEART RATE: 65 BPM | SYSTOLIC BLOOD PRESSURE: 114 MMHG | DIASTOLIC BLOOD PRESSURE: 47 MMHG

## 2020-06-15 VITALS — HEART RATE: 61 BPM | DIASTOLIC BLOOD PRESSURE: 60 MMHG | SYSTOLIC BLOOD PRESSURE: 120 MMHG | RESPIRATION RATE: 15 BRPM

## 2020-06-15 VITALS — TEMPERATURE: 100.5 F

## 2020-06-15 VITALS — SYSTOLIC BLOOD PRESSURE: 105 MMHG | RESPIRATION RATE: 14 BRPM | DIASTOLIC BLOOD PRESSURE: 40 MMHG | HEART RATE: 78 BPM

## 2020-06-15 VITALS — HEART RATE: 95 BPM | SYSTOLIC BLOOD PRESSURE: 138 MMHG | DIASTOLIC BLOOD PRESSURE: 55 MMHG | RESPIRATION RATE: 42 BRPM

## 2020-06-15 VITALS — DIASTOLIC BLOOD PRESSURE: 48 MMHG | SYSTOLIC BLOOD PRESSURE: 103 MMHG | RESPIRATION RATE: 16 BRPM | HEART RATE: 76 BPM

## 2020-06-15 VITALS — HEART RATE: 82 BPM

## 2020-06-15 VITALS — HEART RATE: 97 BPM

## 2020-06-15 VITALS — RESPIRATION RATE: 15 BRPM | SYSTOLIC BLOOD PRESSURE: 118 MMHG | DIASTOLIC BLOOD PRESSURE: 56 MMHG | HEART RATE: 65 BPM

## 2020-06-15 VITALS — SYSTOLIC BLOOD PRESSURE: 113 MMHG | HEART RATE: 59 BPM | DIASTOLIC BLOOD PRESSURE: 55 MMHG | RESPIRATION RATE: 14 BRPM

## 2020-06-15 VITALS — DIASTOLIC BLOOD PRESSURE: 54 MMHG | SYSTOLIC BLOOD PRESSURE: 110 MMHG | HEART RATE: 72 BPM | RESPIRATION RATE: 14 BRPM

## 2020-06-15 VITALS — TEMPERATURE: 99.1 F

## 2020-06-15 VITALS — HEART RATE: 60 BPM

## 2020-06-15 VITALS — TEMPERATURE: 98.9 F

## 2020-06-15 VITALS — SYSTOLIC BLOOD PRESSURE: 105 MMHG | DIASTOLIC BLOOD PRESSURE: 51 MMHG | RESPIRATION RATE: 24 BRPM | HEART RATE: 72 BPM

## 2020-06-15 VITALS — HEART RATE: 65 BPM | RESPIRATION RATE: 14 BRPM

## 2020-06-15 VITALS — HEART RATE: 68 BPM

## 2020-06-15 VITALS — HEART RATE: 77 BPM

## 2020-06-15 VITALS — HEART RATE: 69 BPM

## 2020-06-15 PROCEDURE — 0B9D8ZZ DRAINAGE OF RIGHT MIDDLE LUNG LOBE, VIA NATURAL OR ARTIFICIAL OPENING ENDOSCOPIC: ICD-10-PCS | Performed by: INTERNAL MEDICINE

## 2020-06-15 RX ADMIN — SODIUM CHLORIDE SCH UNIT: 9 INJECTION, SOLUTION INTRAVENOUS at 16:30

## 2020-06-15 RX ADMIN — POTASSIUM CHLORIDE PRN MEQ: 1.5 SOLUTION ORAL at 05:38

## 2020-06-15 RX ADMIN — SODIUM CHLORIDE SCH UNIT: 9 INJECTION, SOLUTION INTRAVENOUS at 23:45

## 2020-06-15 RX ADMIN — HEPARIN SODIUM SCH UNIT: 5000 INJECTION, SOLUTION INTRAVENOUS; SUBCUTANEOUS at 09:27

## 2020-06-15 RX ADMIN — VANCOMYCIN HYDROCHLORIDE SCH MLS/HR: 1 INJECTION, POWDER, LYOPHILIZED, FOR SOLUTION INTRAVENOUS at 09:26

## 2020-06-15 RX ADMIN — PIPERACILLIN SODIUM AND TAZOBACTAM SODIUM SCH MLS/HR: .375; 3 INJECTION, POWDER, LYOPHILIZED, FOR SOLUTION INTRAVENOUS at 09:25

## 2020-06-15 RX ADMIN — FAMOTIDINE SCH MG: 10 INJECTION INTRAVENOUS at 09:26

## 2020-06-15 RX ADMIN — FAMOTIDINE SCH MG: 10 INJECTION INTRAVENOUS at 21:25

## 2020-06-15 RX ADMIN — IPRATROPIUM BROMIDE AND ALBUTEROL SULFATE PRN UDVIAL: .5; 3 SOLUTION RESPIRATORY (INHALATION) at 18:24

## 2020-06-15 RX ADMIN — PIPERACILLIN SODIUM AND TAZOBACTAM SODIUM SCH MLS/HR: .375; 3 INJECTION, POWDER, LYOPHILIZED, FOR SOLUTION INTRAVENOUS at 17:39

## 2020-06-15 RX ADMIN — PIPERACILLIN SODIUM AND TAZOBACTAM SODIUM SCH MLS/HR: .375; 3 INJECTION, POWDER, LYOPHILIZED, FOR SOLUTION INTRAVENOUS at 00:50

## 2020-06-15 RX ADMIN — POTASSIUM CHLORIDE PRN MEQ: 1.5 SOLUTION ORAL at 09:25

## 2020-06-15 RX ADMIN — HEPARIN SODIUM SCH UNIT: 5000 INJECTION, SOLUTION INTRAVENOUS; SUBCUTANEOUS at 21:29

## 2020-06-15 RX ADMIN — METHYLPREDNISOLONE SODIUM SUCCINATE SCH MG: 40 INJECTION, POWDER, FOR SOLUTION INTRAMUSCULAR; INTRAVENOUS at 09:26

## 2020-06-15 RX ADMIN — SODIUM CHLORIDE SCH UNIT: 9 INJECTION, SOLUTION INTRAVENOUS at 05:44

## 2020-06-15 RX ADMIN — SODIUM CHLORIDE SCH UNIT: 9 INJECTION, SOLUTION INTRAVENOUS at 11:30

## 2020-06-15 RX ADMIN — FUROSEMIDE SCH MG: 10 INJECTION INTRAVENOUS at 05:44

## 2020-06-15 NOTE — NUR
[facility] CONSULT



PATIENT ASSESSED AS REQUESTED:  NO OPEN WOUNDS PRESENT, ONLY MULTIPLE DRY SCABS; REPORT 
GIVEN TO PATIENT'S NURSE BARI.

-------------------------------------------------------------------------------

Addendum: 06/15/20 at 1203 by CONCHIS SINGH LVN

-------------------------------------------------------------------------------

Amended: Links added.

## 2020-06-15 NOTE — NUR
Bronchoscopy

Bronchoscopy with right mainstem lavage by Dr. Montoya. Pt tolerated well. 

RT present.

ACLS RNs x2 present

Etomodate 20mg iv given

see vital sign record.

Completed 1725.

## 2020-06-15 NOTE — NUR
FAMILY



Sw spoke to Sindy at [facility]. Per Sindy, pt is her own decision maker, and 
only has Krys  niece listed as ER contact. Per [facility], pt has never had any 
visitors. 

LOUANN called niece and left message requesting call back as soon as possible. waiting on 
response.

## 2020-06-15 NOTE — NUR
RD NOTIFICATION - TUBE FEEDING RECOMMENDATIONS



Pt with increased nutritional needs secondary to protein failure. 



Recommend Pivot 1.5 initiated at 20mls per hour for first 5 hours.

Increase rate as tolerated by 5ml every 5 hours to goal

Goal rate of 45mls/hr to provide 1620kcal, 101gm protein, 820ml free H2O.



Recommend flushes: 150ml every 6 hours. 



Tube feeding order to be faxed to 2nd floor Pod B (ext [phone]).



NUTRITION NOTE

Pt intubated, OGT in place. 

Current tube feedings: Vital HP@40mls per hour (960kcal, 84gm protein, 803 free H2O).

Hx of COPD, HTN, Dementia.

SOHEILA to continue to monitor.


-------------------------------------------------------------------------------

Addendum: 06/15/20 at 0952 by MAMIE SAN RD RD

-------------------------------------------------------------------------------

Amended: Links added.

## 2020-06-16 VITALS — DIASTOLIC BLOOD PRESSURE: 103 MMHG | SYSTOLIC BLOOD PRESSURE: 142 MMHG | HEART RATE: 109 BPM | RESPIRATION RATE: 24 BRPM

## 2020-06-16 VITALS
RESPIRATION RATE: 16 BRPM | HEART RATE: 81 BPM | DIASTOLIC BLOOD PRESSURE: 61 MMHG | SYSTOLIC BLOOD PRESSURE: 118 MMHG | TEMPERATURE: 98.4 F

## 2020-06-16 VITALS — RESPIRATION RATE: 14 BRPM | DIASTOLIC BLOOD PRESSURE: 54 MMHG | HEART RATE: 66 BPM | SYSTOLIC BLOOD PRESSURE: 117 MMHG

## 2020-06-16 VITALS — RESPIRATION RATE: 17 BRPM | DIASTOLIC BLOOD PRESSURE: 34 MMHG | SYSTOLIC BLOOD PRESSURE: 108 MMHG | HEART RATE: 84 BPM

## 2020-06-16 VITALS — DIASTOLIC BLOOD PRESSURE: 50 MMHG | HEART RATE: 60 BPM | SYSTOLIC BLOOD PRESSURE: 108 MMHG | RESPIRATION RATE: 14 BRPM

## 2020-06-16 VITALS — HEART RATE: 81 BPM | RESPIRATION RATE: 20 BRPM

## 2020-06-16 VITALS — TEMPERATURE: 99.8 F

## 2020-06-16 VITALS — SYSTOLIC BLOOD PRESSURE: 130 MMHG | DIASTOLIC BLOOD PRESSURE: 59 MMHG | RESPIRATION RATE: 23 BRPM | HEART RATE: 76 BPM

## 2020-06-16 VITALS — SYSTOLIC BLOOD PRESSURE: 140 MMHG | DIASTOLIC BLOOD PRESSURE: 68 MMHG | HEART RATE: 102 BPM | RESPIRATION RATE: 19 BRPM

## 2020-06-16 VITALS — RESPIRATION RATE: 28 BRPM | SYSTOLIC BLOOD PRESSURE: 118 MMHG | DIASTOLIC BLOOD PRESSURE: 46 MMHG | HEART RATE: 91 BPM

## 2020-06-16 VITALS — RESPIRATION RATE: 14 BRPM | DIASTOLIC BLOOD PRESSURE: 48 MMHG | HEART RATE: 65 BPM | SYSTOLIC BLOOD PRESSURE: 105 MMHG

## 2020-06-16 VITALS — RESPIRATION RATE: 18 BRPM | HEART RATE: 81 BPM | SYSTOLIC BLOOD PRESSURE: 101 MMHG | DIASTOLIC BLOOD PRESSURE: 38 MMHG

## 2020-06-16 VITALS — RESPIRATION RATE: 24 BRPM | HEART RATE: 80 BPM

## 2020-06-16 VITALS — DIASTOLIC BLOOD PRESSURE: 38 MMHG | SYSTOLIC BLOOD PRESSURE: 121 MMHG | RESPIRATION RATE: 13 BRPM | HEART RATE: 84 BPM

## 2020-06-16 VITALS — SYSTOLIC BLOOD PRESSURE: 115 MMHG | RESPIRATION RATE: 14 BRPM | DIASTOLIC BLOOD PRESSURE: 49 MMHG | HEART RATE: 57 BPM

## 2020-06-16 VITALS — DIASTOLIC BLOOD PRESSURE: 60 MMHG | SYSTOLIC BLOOD PRESSURE: 130 MMHG | RESPIRATION RATE: 14 BRPM | HEART RATE: 65 BPM

## 2020-06-16 VITALS — SYSTOLIC BLOOD PRESSURE: 122 MMHG | RESPIRATION RATE: 14 BRPM | HEART RATE: 56 BPM | DIASTOLIC BLOOD PRESSURE: 54 MMHG

## 2020-06-16 VITALS — SYSTOLIC BLOOD PRESSURE: 106 MMHG | HEART RATE: 82 BPM | RESPIRATION RATE: 17 BRPM | DIASTOLIC BLOOD PRESSURE: 42 MMHG

## 2020-06-16 VITALS — SYSTOLIC BLOOD PRESSURE: 136 MMHG | HEART RATE: 56 BPM | RESPIRATION RATE: 14 BRPM | DIASTOLIC BLOOD PRESSURE: 55 MMHG

## 2020-06-16 VITALS — SYSTOLIC BLOOD PRESSURE: 117 MMHG | HEART RATE: 79 BPM | DIASTOLIC BLOOD PRESSURE: 44 MMHG | RESPIRATION RATE: 14 BRPM

## 2020-06-16 VITALS — HEART RATE: 75 BPM | RESPIRATION RATE: 18 BRPM

## 2020-06-16 VITALS — DIASTOLIC BLOOD PRESSURE: 54 MMHG | SYSTOLIC BLOOD PRESSURE: 114 MMHG | HEART RATE: 68 BPM | RESPIRATION RATE: 16 BRPM

## 2020-06-16 VITALS — RESPIRATION RATE: 16 BRPM | DIASTOLIC BLOOD PRESSURE: 49 MMHG | SYSTOLIC BLOOD PRESSURE: 120 MMHG | HEART RATE: 70 BPM

## 2020-06-16 VITALS — SYSTOLIC BLOOD PRESSURE: 121 MMHG | DIASTOLIC BLOOD PRESSURE: 59 MMHG | HEART RATE: 74 BPM | RESPIRATION RATE: 18 BRPM

## 2020-06-16 VITALS — HEART RATE: 70 BPM

## 2020-06-16 VITALS — RESPIRATION RATE: 11 BRPM | HEART RATE: 79 BPM | DIASTOLIC BLOOD PRESSURE: 38 MMHG | SYSTOLIC BLOOD PRESSURE: 108 MMHG

## 2020-06-16 VITALS — HEART RATE: 65 BPM | RESPIRATION RATE: 21 BRPM | DIASTOLIC BLOOD PRESSURE: 51 MMHG | SYSTOLIC BLOOD PRESSURE: 114 MMHG

## 2020-06-16 VITALS — HEART RATE: 90 BPM | RESPIRATION RATE: 14 BRPM | SYSTOLIC BLOOD PRESSURE: 126 MMHG | DIASTOLIC BLOOD PRESSURE: 44 MMHG

## 2020-06-16 VITALS — DIASTOLIC BLOOD PRESSURE: 41 MMHG | SYSTOLIC BLOOD PRESSURE: 113 MMHG | RESPIRATION RATE: 21 BRPM | HEART RATE: 83 BPM

## 2020-06-16 VITALS — HEART RATE: 66 BPM | RESPIRATION RATE: 15 BRPM | DIASTOLIC BLOOD PRESSURE: 53 MMHG | SYSTOLIC BLOOD PRESSURE: 121 MMHG

## 2020-06-16 VITALS — HEART RATE: 84 BPM | SYSTOLIC BLOOD PRESSURE: 116 MMHG | RESPIRATION RATE: 18 BRPM | DIASTOLIC BLOOD PRESSURE: 43 MMHG

## 2020-06-16 VITALS — SYSTOLIC BLOOD PRESSURE: 114 MMHG | HEART RATE: 87 BPM | DIASTOLIC BLOOD PRESSURE: 44 MMHG | RESPIRATION RATE: 16 BRPM

## 2020-06-16 VITALS
SYSTOLIC BLOOD PRESSURE: 106 MMHG | RESPIRATION RATE: 20 BRPM | HEART RATE: 89 BPM | TEMPERATURE: 99 F | DIASTOLIC BLOOD PRESSURE: 40 MMHG

## 2020-06-16 VITALS — DIASTOLIC BLOOD PRESSURE: 48 MMHG | RESPIRATION RATE: 14 BRPM | SYSTOLIC BLOOD PRESSURE: 120 MMHG | HEART RATE: 67 BPM

## 2020-06-16 VITALS — HEART RATE: 98 BPM

## 2020-06-16 VITALS — HEART RATE: 88 BPM | RESPIRATION RATE: 20 BRPM

## 2020-06-16 VITALS — HEART RATE: 75 BPM | SYSTOLIC BLOOD PRESSURE: 111 MMHG | DIASTOLIC BLOOD PRESSURE: 50 MMHG | RESPIRATION RATE: 15 BRPM

## 2020-06-16 VITALS — HEART RATE: 55 BPM

## 2020-06-16 VITALS — SYSTOLIC BLOOD PRESSURE: 110 MMHG | HEART RATE: 79 BPM | DIASTOLIC BLOOD PRESSURE: 65 MMHG | RESPIRATION RATE: 21 BRPM

## 2020-06-16 VITALS — RESPIRATION RATE: 22 BRPM | HEART RATE: 88 BPM

## 2020-06-16 VITALS — DIASTOLIC BLOOD PRESSURE: 47 MMHG | RESPIRATION RATE: 19 BRPM | SYSTOLIC BLOOD PRESSURE: 116 MMHG | HEART RATE: 87 BPM

## 2020-06-16 VITALS — HEART RATE: 75 BPM

## 2020-06-16 VITALS — RESPIRATION RATE: 18 BRPM | DIASTOLIC BLOOD PRESSURE: 50 MMHG | SYSTOLIC BLOOD PRESSURE: 116 MMHG | HEART RATE: 70 BPM

## 2020-06-16 VITALS — DIASTOLIC BLOOD PRESSURE: 49 MMHG | SYSTOLIC BLOOD PRESSURE: 121 MMHG | RESPIRATION RATE: 14 BRPM | HEART RATE: 55 BPM

## 2020-06-16 VITALS — HEART RATE: 85 BPM | SYSTOLIC BLOOD PRESSURE: 112 MMHG | RESPIRATION RATE: 19 BRPM | DIASTOLIC BLOOD PRESSURE: 40 MMHG

## 2020-06-16 VITALS — RESPIRATION RATE: 20 BRPM | HEART RATE: 84 BPM

## 2020-06-16 VITALS — HEART RATE: 82 BPM | RESPIRATION RATE: 19 BRPM | DIASTOLIC BLOOD PRESSURE: 47 MMHG | SYSTOLIC BLOOD PRESSURE: 114 MMHG

## 2020-06-16 VITALS — RESPIRATION RATE: 18 BRPM | HEART RATE: 93 BPM | DIASTOLIC BLOOD PRESSURE: 71 MMHG | SYSTOLIC BLOOD PRESSURE: 147 MMHG

## 2020-06-16 VITALS — RESPIRATION RATE: 15 BRPM | HEART RATE: 79 BPM

## 2020-06-16 VITALS — TEMPERATURE: 99.4 F

## 2020-06-16 VITALS — HEART RATE: 79 BPM | RESPIRATION RATE: 17 BRPM

## 2020-06-16 VITALS — TEMPERATURE: 99.2 F

## 2020-06-16 VITALS — HEART RATE: 90 BPM

## 2020-06-16 VITALS — TEMPERATURE: 100 F

## 2020-06-16 PROCEDURE — 5A09357 ASSISTANCE WITH RESPIRATORY VENTILATION, LESS THAN 24 CONSECUTIVE HOURS, CONTINUOUS POSITIVE AIRWAY PRESSURE: ICD-10-PCS | Performed by: INTERNAL MEDICINE

## 2020-06-16 PROCEDURE — B548ZZA ULTRASONOGRAPHY OF SUPERIOR VENA CAVA, GUIDANCE: ICD-10-PCS | Performed by: INTERNAL MEDICINE

## 2020-06-16 PROCEDURE — 02HV33Z INSERTION OF INFUSION DEVICE INTO SUPERIOR VENA CAVA, PERCUTANEOUS APPROACH: ICD-10-PCS | Performed by: INTERNAL MEDICINE

## 2020-06-16 RX ADMIN — SODIUM CHLORIDE SCH UNIT: 9 INJECTION, SOLUTION INTRAVENOUS at 21:00

## 2020-06-16 RX ADMIN — SODIUM CHLORIDE SCH UNIT: 9 INJECTION, SOLUTION INTRAVENOUS at 07:30

## 2020-06-16 RX ADMIN — IPRATROPIUM BROMIDE AND ALBUTEROL SULFATE SCH UDVIAL: .5; 3 SOLUTION RESPIRATORY (INHALATION) at 23:15

## 2020-06-16 RX ADMIN — SODIUM CHLORIDE SCH UNIT: 9 INJECTION, SOLUTION INTRAVENOUS at 11:30

## 2020-06-16 RX ADMIN — ACETYLCYSTEINE SCH MG: 100 SOLUTION ORAL; RESPIRATORY (INHALATION) at 18:03

## 2020-06-16 RX ADMIN — FAMOTIDINE SCH MG: 10 INJECTION INTRAVENOUS at 20:14

## 2020-06-16 RX ADMIN — HEPARIN SODIUM SCH UNIT: 5000 INJECTION, SOLUTION INTRAVENOUS; SUBCUTANEOUS at 10:16

## 2020-06-16 RX ADMIN — FUROSEMIDE SCH MG: 10 INJECTION, SOLUTION INTRAMUSCULAR; INTRAVENOUS at 10:45

## 2020-06-16 RX ADMIN — PIPERACILLIN SODIUM AND TAZOBACTAM SODIUM SCH MLS/HR: .375; 3 INJECTION, POWDER, LYOPHILIZED, FOR SOLUTION INTRAVENOUS at 10:16

## 2020-06-16 RX ADMIN — IPRATROPIUM BROMIDE AND ALBUTEROL SULFATE PRN UDVIAL: .5; 3 SOLUTION RESPIRATORY (INHALATION) at 09:41

## 2020-06-16 RX ADMIN — HEPARIN SODIUM SCH UNIT: 5000 INJECTION, SOLUTION INTRAVENOUS; SUBCUTANEOUS at 22:45

## 2020-06-16 RX ADMIN — IPRATROPIUM BROMIDE AND ALBUTEROL SULFATE SCH UDVIAL: .5; 3 SOLUTION RESPIRATORY (INHALATION) at 18:04

## 2020-06-16 RX ADMIN — ACETYLCYSTEINE SCH MG: 100 SOLUTION ORAL; RESPIRATORY (INHALATION) at 23:15

## 2020-06-16 RX ADMIN — ACETYLCYSTEINE SCH MG: 100 SOLUTION ORAL; RESPIRATORY (INHALATION) at 13:06

## 2020-06-16 RX ADMIN — IPRATROPIUM BROMIDE AND ALBUTEROL SULFATE SCH UDVIAL: .5; 3 SOLUTION RESPIRATORY (INHALATION) at 13:06

## 2020-06-16 RX ADMIN — SODIUM CHLORIDE SCH UNIT: 9 INJECTION, SOLUTION INTRAVENOUS at 16:30

## 2020-06-16 RX ADMIN — FAMOTIDINE SCH MG: 10 INJECTION INTRAVENOUS at 08:20

## 2020-06-16 RX ADMIN — VANCOMYCIN HYDROCHLORIDE SCH MLS/HR: 1 INJECTION, POWDER, LYOPHILIZED, FOR SOLUTION INTRAVENOUS at 08:20

## 2020-06-16 RX ADMIN — METHYLPREDNISOLONE SODIUM SUCCINATE SCH MG: 40 INJECTION, POWDER, FOR SOLUTION INTRAMUSCULAR; INTRAVENOUS at 08:20

## 2020-06-16 RX ADMIN — PIPERACILLIN SODIUM AND TAZOBACTAM SODIUM SCH MLS/HR: .375; 3 INJECTION, POWDER, LYOPHILIZED, FOR SOLUTION INTRAVENOUS at 17:29

## 2020-06-16 RX ADMIN — PIPERACILLIN SODIUM AND TAZOBACTAM SODIUM SCH MLS/HR: .375; 3 INJECTION, POWDER, LYOPHILIZED, FOR SOLUTION INTRAVENOUS at 02:19

## 2020-06-16 NOTE — NUR
per JAYDEN Camacho, patient was recently extubated this PM.Patient is not ready for skilled PT 
evaluation this PM.WILL ATTEMPT TO INITIATE PHYSICAL THERAPY Evaluation 06/17/2020.

-------------------------------------------------------------------------------

Addendum: 06/16/20 at 1452 by ANAI LILLY, PT PT

-------------------------------------------------------------------------------

Amended: Links added.

## 2020-06-16 NOTE — NUR
Case Management/[facility]

Per case management, pt not eligible for [facility] due to Medicaid status. Dr. Montoya 
notified. Case management states will address discharge planning.

## 2020-06-16 NOTE — NUR
DC PLAN



RECEIVED GIUSEPPE FOR [facility]. CALLED SPOKE TO CLAIRE SAID NO THEY DO NOT ACCEPT MEDICAID [facility] NP KNOW. 

-------------------------------------------------------------------------------

Addendum: 06/16/20 at 1535 by SARAH URIZ RN CM

-------------------------------------------------------------------------------

Amended: Links added.

## 2020-06-17 VITALS — SYSTOLIC BLOOD PRESSURE: 120 MMHG | RESPIRATION RATE: 22 BRPM | HEART RATE: 82 BPM | DIASTOLIC BLOOD PRESSURE: 40 MMHG

## 2020-06-17 VITALS — RESPIRATION RATE: 24 BRPM | SYSTOLIC BLOOD PRESSURE: 132 MMHG | HEART RATE: 87 BPM | DIASTOLIC BLOOD PRESSURE: 60 MMHG

## 2020-06-17 VITALS — HEART RATE: 88 BPM | RESPIRATION RATE: 19 BRPM

## 2020-06-17 VITALS — RESPIRATION RATE: 22 BRPM | DIASTOLIC BLOOD PRESSURE: 54 MMHG | HEART RATE: 111 BPM | SYSTOLIC BLOOD PRESSURE: 121 MMHG

## 2020-06-17 VITALS — RESPIRATION RATE: 26 BRPM | HEART RATE: 100 BPM | DIASTOLIC BLOOD PRESSURE: 52 MMHG | SYSTOLIC BLOOD PRESSURE: 135 MMHG

## 2020-06-17 VITALS — DIASTOLIC BLOOD PRESSURE: 50 MMHG | RESPIRATION RATE: 22 BRPM | SYSTOLIC BLOOD PRESSURE: 119 MMHG | HEART RATE: 84 BPM

## 2020-06-17 VITALS — RESPIRATION RATE: 25 BRPM | DIASTOLIC BLOOD PRESSURE: 50 MMHG | HEART RATE: 91 BPM | SYSTOLIC BLOOD PRESSURE: 121 MMHG

## 2020-06-17 VITALS — DIASTOLIC BLOOD PRESSURE: 56 MMHG | RESPIRATION RATE: 19 BRPM | SYSTOLIC BLOOD PRESSURE: 132 MMHG | HEART RATE: 85 BPM

## 2020-06-17 VITALS — RESPIRATION RATE: 24 BRPM | DIASTOLIC BLOOD PRESSURE: 53 MMHG | SYSTOLIC BLOOD PRESSURE: 117 MMHG | HEART RATE: 98 BPM

## 2020-06-17 VITALS — SYSTOLIC BLOOD PRESSURE: 115 MMHG | HEART RATE: 105 BPM | DIASTOLIC BLOOD PRESSURE: 43 MMHG | RESPIRATION RATE: 23 BRPM

## 2020-06-17 VITALS — RESPIRATION RATE: 25 BRPM | DIASTOLIC BLOOD PRESSURE: 39 MMHG | HEART RATE: 93 BPM | SYSTOLIC BLOOD PRESSURE: 116 MMHG

## 2020-06-17 VITALS — HEART RATE: 85 BPM | RESPIRATION RATE: 17 BRPM | DIASTOLIC BLOOD PRESSURE: 46 MMHG | SYSTOLIC BLOOD PRESSURE: 132 MMHG

## 2020-06-17 VITALS — RESPIRATION RATE: 60 BRPM | SYSTOLIC BLOOD PRESSURE: 124 MMHG | DIASTOLIC BLOOD PRESSURE: 58 MMHG | HEART RATE: 110 BPM

## 2020-06-17 VITALS — RESPIRATION RATE: 24 BRPM | SYSTOLIC BLOOD PRESSURE: 127 MMHG | HEART RATE: 89 BPM | DIASTOLIC BLOOD PRESSURE: 63 MMHG

## 2020-06-17 VITALS — RESPIRATION RATE: 18 BRPM | HEART RATE: 91 BPM

## 2020-06-17 VITALS — HEART RATE: 91 BPM | SYSTOLIC BLOOD PRESSURE: 120 MMHG | DIASTOLIC BLOOD PRESSURE: 48 MMHG | RESPIRATION RATE: 13 BRPM

## 2020-06-17 VITALS — DIASTOLIC BLOOD PRESSURE: 46 MMHG | HEART RATE: 90 BPM | SYSTOLIC BLOOD PRESSURE: 115 MMHG | RESPIRATION RATE: 19 BRPM

## 2020-06-17 VITALS
HEART RATE: 82 BPM | SYSTOLIC BLOOD PRESSURE: 136 MMHG | RESPIRATION RATE: 19 BRPM | TEMPERATURE: 99.5 F | DIASTOLIC BLOOD PRESSURE: 52 MMHG

## 2020-06-17 VITALS — HEART RATE: 86 BPM | RESPIRATION RATE: 18 BRPM

## 2020-06-17 VITALS — RESPIRATION RATE: 23 BRPM | SYSTOLIC BLOOD PRESSURE: 114 MMHG | HEART RATE: 97 BPM | DIASTOLIC BLOOD PRESSURE: 53 MMHG

## 2020-06-17 VITALS — RESPIRATION RATE: 22 BRPM | DIASTOLIC BLOOD PRESSURE: 42 MMHG | SYSTOLIC BLOOD PRESSURE: 113 MMHG | HEART RATE: 83 BPM

## 2020-06-17 VITALS — HEART RATE: 86 BPM | RESPIRATION RATE: 21 BRPM | DIASTOLIC BLOOD PRESSURE: 55 MMHG | SYSTOLIC BLOOD PRESSURE: 138 MMHG

## 2020-06-17 VITALS — HEART RATE: 95 BPM | SYSTOLIC BLOOD PRESSURE: 123 MMHG | DIASTOLIC BLOOD PRESSURE: 51 MMHG | RESPIRATION RATE: 16 BRPM

## 2020-06-17 VITALS — DIASTOLIC BLOOD PRESSURE: 43 MMHG | SYSTOLIC BLOOD PRESSURE: 105 MMHG | RESPIRATION RATE: 19 BRPM | HEART RATE: 91 BPM

## 2020-06-17 VITALS — TEMPERATURE: 98.7 F

## 2020-06-17 VITALS — RESPIRATION RATE: 19 BRPM | HEART RATE: 92 BPM

## 2020-06-17 VITALS — DIASTOLIC BLOOD PRESSURE: 46 MMHG | SYSTOLIC BLOOD PRESSURE: 122 MMHG | HEART RATE: 83 BPM | RESPIRATION RATE: 17 BRPM

## 2020-06-17 VITALS — DIASTOLIC BLOOD PRESSURE: 52 MMHG | SYSTOLIC BLOOD PRESSURE: 118 MMHG | RESPIRATION RATE: 20 BRPM | HEART RATE: 94 BPM

## 2020-06-17 VITALS — DIASTOLIC BLOOD PRESSURE: 47 MMHG | RESPIRATION RATE: 16 BRPM | SYSTOLIC BLOOD PRESSURE: 120 MMHG | HEART RATE: 88 BPM

## 2020-06-17 VITALS — HEART RATE: 76 BPM | RESPIRATION RATE: 20 BRPM

## 2020-06-17 VITALS — DIASTOLIC BLOOD PRESSURE: 72 MMHG | HEART RATE: 84 BPM | RESPIRATION RATE: 19 BRPM | SYSTOLIC BLOOD PRESSURE: 132 MMHG

## 2020-06-17 VITALS — RESPIRATION RATE: 18 BRPM | HEART RATE: 88 BPM

## 2020-06-17 VITALS — RESPIRATION RATE: 19 BRPM | SYSTOLIC BLOOD PRESSURE: 113 MMHG | HEART RATE: 101 BPM | DIASTOLIC BLOOD PRESSURE: 47 MMHG

## 2020-06-17 VITALS — RESPIRATION RATE: 30 BRPM | SYSTOLIC BLOOD PRESSURE: 114 MMHG | HEART RATE: 96 BPM | DIASTOLIC BLOOD PRESSURE: 36 MMHG

## 2020-06-17 VITALS — TEMPERATURE: 98.8 F

## 2020-06-17 VITALS — RESPIRATION RATE: 18 BRPM | HEART RATE: 90 BPM

## 2020-06-17 VITALS — SYSTOLIC BLOOD PRESSURE: 109 MMHG | RESPIRATION RATE: 25 BRPM | HEART RATE: 90 BPM | DIASTOLIC BLOOD PRESSURE: 49 MMHG

## 2020-06-17 VITALS — HEART RATE: 84 BPM | DIASTOLIC BLOOD PRESSURE: 44 MMHG | SYSTOLIC BLOOD PRESSURE: 122 MMHG | RESPIRATION RATE: 15 BRPM

## 2020-06-17 VITALS — HEART RATE: 103 BPM | DIASTOLIC BLOOD PRESSURE: 48 MMHG | RESPIRATION RATE: 19 BRPM | SYSTOLIC BLOOD PRESSURE: 116 MMHG

## 2020-06-17 VITALS — RESPIRATION RATE: 21 BRPM | DIASTOLIC BLOOD PRESSURE: 55 MMHG | HEART RATE: 88 BPM | SYSTOLIC BLOOD PRESSURE: 122 MMHG

## 2020-06-17 VITALS — HEART RATE: 86 BPM | DIASTOLIC BLOOD PRESSURE: 50 MMHG | RESPIRATION RATE: 15 BRPM | SYSTOLIC BLOOD PRESSURE: 130 MMHG

## 2020-06-17 VITALS — SYSTOLIC BLOOD PRESSURE: 110 MMHG | RESPIRATION RATE: 18 BRPM | HEART RATE: 86 BPM | DIASTOLIC BLOOD PRESSURE: 37 MMHG

## 2020-06-17 VITALS — HEART RATE: 96 BPM | RESPIRATION RATE: 20 BRPM | DIASTOLIC BLOOD PRESSURE: 38 MMHG | SYSTOLIC BLOOD PRESSURE: 105 MMHG

## 2020-06-17 VITALS — HEART RATE: 88 BPM | RESPIRATION RATE: 20 BRPM

## 2020-06-17 VITALS — SYSTOLIC BLOOD PRESSURE: 116 MMHG | HEART RATE: 81 BPM | RESPIRATION RATE: 17 BRPM | DIASTOLIC BLOOD PRESSURE: 46 MMHG

## 2020-06-17 VITALS — DIASTOLIC BLOOD PRESSURE: 41 MMHG | SYSTOLIC BLOOD PRESSURE: 116 MMHG | RESPIRATION RATE: 20 BRPM | HEART RATE: 88 BPM

## 2020-06-17 VITALS — TEMPERATURE: 98.3 F

## 2020-06-17 VITALS — SYSTOLIC BLOOD PRESSURE: 95 MMHG | DIASTOLIC BLOOD PRESSURE: 47 MMHG | HEART RATE: 89 BPM | RESPIRATION RATE: 21 BRPM

## 2020-06-17 VITALS — HEART RATE: 107 BPM | RESPIRATION RATE: 22 BRPM | SYSTOLIC BLOOD PRESSURE: 121 MMHG | DIASTOLIC BLOOD PRESSURE: 53 MMHG

## 2020-06-17 VITALS — TEMPERATURE: 97.9 F

## 2020-06-17 PROCEDURE — 5A09357 ASSISTANCE WITH RESPIRATORY VENTILATION, LESS THAN 24 CONSECUTIVE HOURS, CONTINUOUS POSITIVE AIRWAY PRESSURE: ICD-10-PCS | Performed by: INTERNAL MEDICINE

## 2020-06-17 RX ADMIN — POTASSIUM CHLORIDE PRN MLS/HR: 14.9 INJECTION, SOLUTION INTRAVENOUS at 10:25

## 2020-06-17 RX ADMIN — ACETYLCYSTEINE SCH MG: 100 SOLUTION ORAL; RESPIRATORY (INHALATION) at 23:07

## 2020-06-17 RX ADMIN — HEPARIN SODIUM SCH UNIT: 5000 INJECTION, SOLUTION INTRAVENOUS; SUBCUTANEOUS at 08:17

## 2020-06-17 RX ADMIN — FAMOTIDINE SCH MG: 10 INJECTION INTRAVENOUS at 21:21

## 2020-06-17 RX ADMIN — LINEZOLID SCH MLS/HR: 600 INJECTION, SOLUTION INTRAVENOUS at 21:20

## 2020-06-17 RX ADMIN — ACETYLCYSTEINE SCH MG: 100 SOLUTION ORAL; RESPIRATORY (INHALATION) at 06:59

## 2020-06-17 RX ADMIN — MEROPENEM SCH MG: 500 INJECTION, POWDER, FOR SOLUTION INTRAVENOUS at 08:16

## 2020-06-17 RX ADMIN — SODIUM CHLORIDE SCH UNIT: 9 INJECTION, SOLUTION INTRAVENOUS at 11:30

## 2020-06-17 RX ADMIN — ACETYLCYSTEINE SCH MG: 100 SOLUTION ORAL; RESPIRATORY (INHALATION) at 11:13

## 2020-06-17 RX ADMIN — POTASSIUM CHLORIDE PRN MLS/HR: 14.9 INJECTION, SOLUTION INTRAVENOUS at 08:13

## 2020-06-17 RX ADMIN — IPRATROPIUM BROMIDE AND ALBUTEROL SULFATE PRN UDVIAL: .5; 3 SOLUTION RESPIRATORY (INHALATION) at 23:07

## 2020-06-17 RX ADMIN — SODIUM CHLORIDE SCH UNIT: 9 INJECTION, SOLUTION INTRAVENOUS at 05:47

## 2020-06-17 RX ADMIN — FAMOTIDINE SCH MG: 10 INJECTION INTRAVENOUS at 08:13

## 2020-06-17 RX ADMIN — ACETYLCYSTEINE SCH MG: 100 SOLUTION ORAL; RESPIRATORY (INHALATION) at 18:39

## 2020-06-17 RX ADMIN — PIPERACILLIN SODIUM AND TAZOBACTAM SODIUM SCH MLS/HR: .375; 3 INJECTION, POWDER, LYOPHILIZED, FOR SOLUTION INTRAVENOUS at 02:26

## 2020-06-17 RX ADMIN — HEPARIN SODIUM SCH UNIT: 5000 INJECTION, SOLUTION INTRAVENOUS; SUBCUTANEOUS at 21:24

## 2020-06-17 RX ADMIN — SODIUM CHLORIDE SCH UNIT: 9 INJECTION, SOLUTION INTRAVENOUS at 21:00

## 2020-06-17 RX ADMIN — IPRATROPIUM BROMIDE AND ALBUTEROL SULFATE PRN UDVIAL: .5; 3 SOLUTION RESPIRATORY (INHALATION) at 11:13

## 2020-06-17 RX ADMIN — IPRATROPIUM BROMIDE AND ALBUTEROL SULFATE SCH UDVIAL: .5; 3 SOLUTION RESPIRATORY (INHALATION) at 06:59

## 2020-06-17 RX ADMIN — FUROSEMIDE SCH MG: 10 INJECTION, SOLUTION INTRAMUSCULAR; INTRAVENOUS at 08:18

## 2020-06-17 RX ADMIN — IPRATROPIUM BROMIDE AND ALBUTEROL SULFATE PRN UDVIAL: .5; 3 SOLUTION RESPIRATORY (INHALATION) at 18:39

## 2020-06-17 RX ADMIN — LINEZOLID SCH MLS/HR: 600 INJECTION, SOLUTION INTRAVENOUS at 08:14

## 2020-06-17 RX ADMIN — SODIUM CHLORIDE SCH UNIT: 9 INJECTION, SOLUTION INTRAVENOUS at 16:30

## 2020-06-17 RX ADMIN — MEROPENEM SCH MG: 500 INJECTION, POWDER, FOR SOLUTION INTRAVENOUS at 16:18

## 2020-06-17 NOTE — NUR
DYSPHAGIA EVAL COMPLETED



+S/S OF ASPIRATION AT THIS TIME. RECOMMEND PUREED, NECTAR THICK LIQUIDS, AND PILLS CRUSHED 
WITH APPLESAUCE AS TOLERATED.



RECOMMEND MBSS TO RULE OUT SILENT ASPIRATION. 



SLP COORDINATED WITH NURSE GARRETT OF CARE AND RECOMMENDATIONS. 



RECOMMENDATIONS:

DYSPHAGIA THERAPY 3-5XWEEK TO INCREASE ORAL MOTOR STRENGTH AND PHARYNGEAL SWALLOW:

LTG#1: Pt WILL TOLERATE LEAST RESTRICTIVE DIET TO MEET NUTRITION/HYDRATION WITH NO S/S OF 
ASPIRATION. 

LTG#2: SKILLED EDUCATION Pt/FAMILY/STAFF

STG#1: Pt WILL PARTICIPATE IN LARYNGEAL ELEVATION/EXCURSION EXERCISES WITH 80% ACCURACY.

STG#2: Pt WILL PARTICIPATE IN TONGUE BASE RETRACTION EXERCISES WITH 80% ACCURACY.

STG#3: Pt WILL TOLERATE THERAPEUTIC TRIALS OF THIN LIQUID WITH NO OVERT S/S OF ASPIRATION.

STG#4: SKILLED EDUCATION Pt/FAMILY/STAFF.




-------------------------------------------------------------------------------

Addendum: 06/17/20 at [address] CINTHIA ANDERSON

-------------------------------------------------------------------------------

Amended: Links added.

## 2020-06-18 VITALS — SYSTOLIC BLOOD PRESSURE: 130 MMHG | HEART RATE: 104 BPM | RESPIRATION RATE: 20 BRPM | DIASTOLIC BLOOD PRESSURE: 96 MMHG

## 2020-06-18 VITALS — RESPIRATION RATE: 36 BRPM | SYSTOLIC BLOOD PRESSURE: 115 MMHG | HEART RATE: 43 BPM | DIASTOLIC BLOOD PRESSURE: 71 MMHG

## 2020-06-18 VITALS — RESPIRATION RATE: 54 BRPM | HEART RATE: 97 BPM

## 2020-06-18 VITALS — RESPIRATION RATE: 17 BRPM | SYSTOLIC BLOOD PRESSURE: 133 MMHG | HEART RATE: 98 BPM | DIASTOLIC BLOOD PRESSURE: 80 MMHG

## 2020-06-18 VITALS — RESPIRATION RATE: 15 BRPM | SYSTOLIC BLOOD PRESSURE: 131 MMHG | DIASTOLIC BLOOD PRESSURE: 53 MMHG | HEART RATE: 93 BPM

## 2020-06-18 VITALS — RESPIRATION RATE: 45 BRPM | SYSTOLIC BLOOD PRESSURE: 120 MMHG | DIASTOLIC BLOOD PRESSURE: 50 MMHG | HEART RATE: 91 BPM

## 2020-06-18 VITALS — RESPIRATION RATE: 21 BRPM | HEART RATE: 93 BPM

## 2020-06-18 VITALS
DIASTOLIC BLOOD PRESSURE: 52 MMHG | SYSTOLIC BLOOD PRESSURE: 119 MMHG | TEMPERATURE: 97.8 F | RESPIRATION RATE: 21 BRPM | HEART RATE: 100 BPM

## 2020-06-18 VITALS — HEART RATE: 98 BPM | RESPIRATION RATE: 22 BRPM

## 2020-06-18 VITALS — HEART RATE: 90 BPM | DIASTOLIC BLOOD PRESSURE: 46 MMHG | SYSTOLIC BLOOD PRESSURE: 127 MMHG | RESPIRATION RATE: 15 BRPM

## 2020-06-18 VITALS — HEART RATE: 96 BPM | RESPIRATION RATE: 18 BRPM

## 2020-06-18 VITALS — RESPIRATION RATE: 20 BRPM | HEART RATE: 93 BPM

## 2020-06-18 VITALS — HEART RATE: 92 BPM | RESPIRATION RATE: 20 BRPM

## 2020-06-18 VITALS — RESPIRATION RATE: 18 BRPM | HEART RATE: 92 BPM

## 2020-06-18 VITALS — SYSTOLIC BLOOD PRESSURE: 127 MMHG | HEART RATE: 90 BPM | RESPIRATION RATE: 15 BRPM | DIASTOLIC BLOOD PRESSURE: 46 MMHG

## 2020-06-18 VITALS — RESPIRATION RATE: 20 BRPM | HEART RATE: 88 BPM

## 2020-06-18 VITALS — TEMPERATURE: 98.7 F | RESPIRATION RATE: 39 BRPM | HEART RATE: 91 BPM

## 2020-06-18 VITALS — RESPIRATION RATE: 19 BRPM | HEART RATE: 89 BPM | SYSTOLIC BLOOD PRESSURE: 123 MMHG | DIASTOLIC BLOOD PRESSURE: 49 MMHG

## 2020-06-18 VITALS — RESPIRATION RATE: 21 BRPM | HEART RATE: 106 BPM | DIASTOLIC BLOOD PRESSURE: 53 MMHG | SYSTOLIC BLOOD PRESSURE: 123 MMHG

## 2020-06-18 VITALS
TEMPERATURE: 98.7 F | RESPIRATION RATE: 32 BRPM | HEART RATE: 92 BPM | SYSTOLIC BLOOD PRESSURE: 124 MMHG | DIASTOLIC BLOOD PRESSURE: 47 MMHG

## 2020-06-18 VITALS — RESPIRATION RATE: 26 BRPM | HEART RATE: 104 BPM

## 2020-06-18 VITALS
DIASTOLIC BLOOD PRESSURE: 55 MMHG | SYSTOLIC BLOOD PRESSURE: 129 MMHG | TEMPERATURE: 99.7 F | HEART RATE: 93 BPM | RESPIRATION RATE: 16 BRPM

## 2020-06-18 VITALS — RESPIRATION RATE: 18 BRPM | SYSTOLIC BLOOD PRESSURE: 123 MMHG | HEART RATE: 91 BPM | DIASTOLIC BLOOD PRESSURE: 47 MMHG

## 2020-06-18 VITALS — RESPIRATION RATE: 22 BRPM | HEART RATE: 95 BPM

## 2020-06-18 VITALS — SYSTOLIC BLOOD PRESSURE: 128 MMHG | HEART RATE: 91 BPM | RESPIRATION RATE: 17 BRPM | DIASTOLIC BLOOD PRESSURE: 54 MMHG

## 2020-06-18 VITALS — DIASTOLIC BLOOD PRESSURE: 46 MMHG | SYSTOLIC BLOOD PRESSURE: 120 MMHG | HEART RATE: 97 BPM | RESPIRATION RATE: 22 BRPM

## 2020-06-18 VITALS — RESPIRATION RATE: 27 BRPM | HEART RATE: 96 BPM | SYSTOLIC BLOOD PRESSURE: 136 MMHG | DIASTOLIC BLOOD PRESSURE: 56 MMHG

## 2020-06-18 VITALS — HEART RATE: 90 BPM | DIASTOLIC BLOOD PRESSURE: 46 MMHG | RESPIRATION RATE: 15 BRPM | SYSTOLIC BLOOD PRESSURE: 127 MMHG

## 2020-06-18 VITALS — HEART RATE: 91 BPM | RESPIRATION RATE: 37 BRPM | SYSTOLIC BLOOD PRESSURE: 124 MMHG | DIASTOLIC BLOOD PRESSURE: 47 MMHG

## 2020-06-18 VITALS — HEART RATE: 92 BPM | SYSTOLIC BLOOD PRESSURE: 119 MMHG | RESPIRATION RATE: 18 BRPM | DIASTOLIC BLOOD PRESSURE: 46 MMHG

## 2020-06-18 VITALS — TEMPERATURE: 98.8 F

## 2020-06-18 VITALS — DIASTOLIC BLOOD PRESSURE: 55 MMHG | SYSTOLIC BLOOD PRESSURE: 128 MMHG | RESPIRATION RATE: 17 BRPM | HEART RATE: 94 BPM

## 2020-06-18 VITALS — RESPIRATION RATE: 27 BRPM | HEART RATE: 90 BPM | DIASTOLIC BLOOD PRESSURE: 49 MMHG | SYSTOLIC BLOOD PRESSURE: 118 MMHG

## 2020-06-18 VITALS — DIASTOLIC BLOOD PRESSURE: 56 MMHG | SYSTOLIC BLOOD PRESSURE: 100 MMHG | RESPIRATION RATE: 32 BRPM | HEART RATE: 111 BPM

## 2020-06-18 VITALS — HEART RATE: 93 BPM | RESPIRATION RATE: 28 BRPM | SYSTOLIC BLOOD PRESSURE: 127 MMHG | DIASTOLIC BLOOD PRESSURE: 59 MMHG

## 2020-06-18 VITALS — TEMPERATURE: 97.7 F

## 2020-06-18 VITALS — RESPIRATION RATE: 28 BRPM | HEART RATE: 105 BPM

## 2020-06-18 RX ADMIN — FAMOTIDINE SCH MG: 10 INJECTION INTRAVENOUS at 21:21

## 2020-06-18 RX ADMIN — SODIUM CHLORIDE SCH UNIT: 9 INJECTION, SOLUTION INTRAVENOUS at 21:00

## 2020-06-18 RX ADMIN — HEPARIN SODIUM SCH UNIT: 5000 INJECTION, SOLUTION INTRAVENOUS; SUBCUTANEOUS at 21:29

## 2020-06-18 RX ADMIN — LINEZOLID SCH MLS/HR: 600 INJECTION, SOLUTION INTRAVENOUS at 09:28

## 2020-06-18 RX ADMIN — IPRATROPIUM BROMIDE AND ALBUTEROL SULFATE PRN UDVIAL: .5; 3 SOLUTION RESPIRATORY (INHALATION) at 18:16

## 2020-06-18 RX ADMIN — MEROPENEM SCH MG: 500 INJECTION, POWDER, FOR SOLUTION INTRAVENOUS at 01:30

## 2020-06-18 RX ADMIN — MEROPENEM SCH MG: 500 INJECTION, POWDER, FOR SOLUTION INTRAVENOUS at 17:14

## 2020-06-18 RX ADMIN — SODIUM CHLORIDE SCH UNIT: 9 INJECTION, SOLUTION INTRAVENOUS at 16:30

## 2020-06-18 RX ADMIN — HEPARIN SODIUM SCH UNIT: 5000 INJECTION, SOLUTION INTRAVENOUS; SUBCUTANEOUS at 09:29

## 2020-06-18 RX ADMIN — IPRATROPIUM BROMIDE AND ALBUTEROL SULFATE PRN UDVIAL: .5; 3 SOLUTION RESPIRATORY (INHALATION) at 11:06

## 2020-06-18 RX ADMIN — ACETYLCYSTEINE SCH MG: 100 SOLUTION ORAL; RESPIRATORY (INHALATION) at 18:16

## 2020-06-18 RX ADMIN — MEROPENEM SCH MG: 500 INJECTION, POWDER, FOR SOLUTION INTRAVENOUS at 09:26

## 2020-06-18 RX ADMIN — SODIUM CHLORIDE SCH UNIT: 9 INJECTION, SOLUTION INTRAVENOUS at 07:30

## 2020-06-18 RX ADMIN — IPRATROPIUM BROMIDE AND ALBUTEROL SULFATE PRN UDVIAL: .5; 3 SOLUTION RESPIRATORY (INHALATION) at 06:36

## 2020-06-18 RX ADMIN — ACETYLCYSTEINE SCH MG: 100 SOLUTION ORAL; RESPIRATORY (INHALATION) at 06:36

## 2020-06-18 RX ADMIN — ACETYLCYSTEINE SCH MG: 100 SOLUTION ORAL; RESPIRATORY (INHALATION) at 11:06

## 2020-06-18 RX ADMIN — FAMOTIDINE SCH MG: 10 INJECTION INTRAVENOUS at 09:26

## 2020-06-18 RX ADMIN — LINEZOLID SCH MLS/HR: 600 INJECTION, SOLUTION INTRAVENOUS at 21:21

## 2020-06-18 RX ADMIN — FUROSEMIDE SCH MG: 10 INJECTION, SOLUTION INTRAMUSCULAR; INTRAVENOUS at 10:45

## 2020-06-18 RX ADMIN — SODIUM CHLORIDE SCH UNIT: 9 INJECTION, SOLUTION INTRAVENOUS at 11:16

## 2020-06-18 NOTE — NUR
MBSS COMPLETED.



NO ASPIRATION OR PENETRATION AT THIS TIME. RECOMMEND PUREED SOLIDS, THIN LIQUIDS,  AND PILLS 
CRUSHED WITH APPLESAUCE AS TOLERATED. 



COMPENSATORY STRATEGIES: SMALL BITES/ SIPS, ALTERNATE BITES/SIPS, EXTRA DRY SWALLOWS, SIT 
UPRIGHT DURING AND 30 MINUTES AFTER MEALTIMES. 



SLP REVIEWED RESULTS AND RECOMMENDATIONS WITH Pt AND NURSE BAILEY. 



RECOMMENDATIONS:

DYSPHAGIA THERAPY 3-5XWEEK TO INCREASE ORAL MOTOR STRENGTH AND PHARYNGEAL SWALLOW:

LTG#1: Pt WILL TOLERATE LEAST RESTRICTIVE DIET TO MEET NUTRITION/HYDRATION WITH NO S/S OF 
ASPIRATION. 

LTG#2: SKILLED EDUCATION Pt/FAMILY/STAFF

STG#1: Pt WILL PARTICIPATE IN LARYNGEAL ELEVATION/EXCURSION EXERCISES WITH 80% ACCURACY.

STG#2: Pt WILL PARTICIPATE IN TONGUE BASE RETRACTION EXERCISES WITH 80% ACCURACY.

STG#3: Pt WILL PARTICIPATE IN ORAL MOTOR EXERCISES WITH 80% ACCURACY.

STG#4: Pt WILL TOLERATE THERAPEUTIC TRIALS OF MECHANICAL SOFT/GROUND WITH NO OVERT S/S OF 
ASPIRATION.

STG#5: Pt WILL BE ABLE TO PARTICIPATE IN MBSS AFTER 2-4 WEEKS OF THERAPEUTIC INTERVENTION.

STG#6: SKILLED EDUCATION Pt/FAMILY/STAFF.


-------------------------------------------------------------------------------

Addendum: 06/18/20 at [address] CINTHIA ANDERSON

-------------------------------------------------------------------------------

Amended: Links added.

## 2020-06-18 NOTE — NUR
RD FOLLOW UP 



Pt is s/p extubation, Pneumothorax with Chest tube in place, as per EMR. Pt tolerating Heart 
healthy, Puree, NTL diet order with no report of GI distress, Fair PO intake. Elevated BG 
262. BLE 2+/BUE 3+ Pitting edema. 



Recommend 75gm CC diet modification

Recommend 60mL ProMod QD.



RD to continue to monitor. Please notify as additional nutrition concerns arise. Thank you. 

-------------------------------------------------------------------------------

Addendum: 06/18/20 at 1537 [address] MAMIE SAN RD RD

-------------------------------------------------------------------------------

Amended: Links added.

## 2020-06-19 VITALS
RESPIRATION RATE: 22 BRPM | DIASTOLIC BLOOD PRESSURE: 74 MMHG | SYSTOLIC BLOOD PRESSURE: 121 MMHG | TEMPERATURE: 96.4 F | HEART RATE: 50 BPM

## 2020-06-19 VITALS — HEART RATE: 97 BPM | RESPIRATION RATE: 22 BRPM

## 2020-06-19 VITALS — RESPIRATION RATE: 18 BRPM | HEART RATE: 105 BPM

## 2020-06-19 VITALS
DIASTOLIC BLOOD PRESSURE: 49 MMHG | RESPIRATION RATE: 14 BRPM | SYSTOLIC BLOOD PRESSURE: 137 MMHG | HEART RATE: 106 BPM | TEMPERATURE: 98.7 F

## 2020-06-19 VITALS
RESPIRATION RATE: 16 BRPM | TEMPERATURE: 99.3 F | HEART RATE: 90 BPM | SYSTOLIC BLOOD PRESSURE: 119 MMHG | DIASTOLIC BLOOD PRESSURE: 56 MMHG

## 2020-06-19 VITALS — HEART RATE: 108 BPM | RESPIRATION RATE: 18 BRPM

## 2020-06-19 VITALS — HEART RATE: 114 BPM | RESPIRATION RATE: 18 BRPM

## 2020-06-19 VITALS
SYSTOLIC BLOOD PRESSURE: 106 MMHG | DIASTOLIC BLOOD PRESSURE: 56 MMHG | TEMPERATURE: 98.4 F | RESPIRATION RATE: 18 BRPM | HEART RATE: 94 BPM

## 2020-06-19 VITALS
HEART RATE: 54 BPM | DIASTOLIC BLOOD PRESSURE: 63 MMHG | TEMPERATURE: 97.9 F | RESPIRATION RATE: 22 BRPM | SYSTOLIC BLOOD PRESSURE: 149 MMHG

## 2020-06-19 VITALS — HEART RATE: 100 BPM | RESPIRATION RATE: 18 BRPM

## 2020-06-19 VITALS — HEART RATE: 96 BPM | RESPIRATION RATE: 20 BRPM

## 2020-06-19 VITALS — HEART RATE: 101 BPM | RESPIRATION RATE: 18 BRPM

## 2020-06-19 VITALS — RESPIRATION RATE: 18 BRPM | HEART RATE: 93 BPM

## 2020-06-19 VITALS — HEART RATE: 91 BPM | RESPIRATION RATE: 24 BRPM

## 2020-06-19 VITALS — RESPIRATION RATE: 18 BRPM

## 2020-06-19 PROCEDURE — 5A09357 ASSISTANCE WITH RESPIRATORY VENTILATION, LESS THAN 24 CONSECUTIVE HOURS, CONTINUOUS POSITIVE AIRWAY PRESSURE: ICD-10-PCS | Performed by: INTERNAL MEDICINE

## 2020-06-19 RX ADMIN — SODIUM CHLORIDE SCH UNIT: 9 INJECTION, SOLUTION INTRAVENOUS at 11:30

## 2020-06-19 RX ADMIN — IPRATROPIUM BROMIDE AND ALBUTEROL SULFATE PRN UDVIAL: .5; 3 SOLUTION RESPIRATORY (INHALATION) at 19:49

## 2020-06-19 RX ADMIN — ACETYLCYSTEINE SCH MG: 100 SOLUTION ORAL; RESPIRATORY (INHALATION) at 11:14

## 2020-06-19 RX ADMIN — IPRATROPIUM BROMIDE AND ALBUTEROL SULFATE PRN UDVIAL: .5; 3 SOLUTION RESPIRATORY (INHALATION) at 00:28

## 2020-06-19 RX ADMIN — ACETYLCYSTEINE SCH MG: 100 SOLUTION ORAL; RESPIRATORY (INHALATION) at 19:49

## 2020-06-19 RX ADMIN — MEROPENEM SCH MG: 500 INJECTION, POWDER, FOR SOLUTION INTRAVENOUS at 00:42

## 2020-06-19 RX ADMIN — IPRATROPIUM BROMIDE AND ALBUTEROL SULFATE PRN UDVIAL: .5; 3 SOLUTION RESPIRATORY (INHALATION) at 11:14

## 2020-06-19 RX ADMIN — HEPARIN SODIUM SCH UNIT: 5000 INJECTION, SOLUTION INTRAVENOUS; SUBCUTANEOUS at 21:39

## 2020-06-19 RX ADMIN — ACETYLCYSTEINE SCH MG: 100 SOLUTION ORAL; RESPIRATORY (INHALATION) at 06:11

## 2020-06-19 RX ADMIN — HEPARIN SODIUM SCH UNIT: 5000 INJECTION, SOLUTION INTRAVENOUS; SUBCUTANEOUS at 11:34

## 2020-06-19 RX ADMIN — MEROPENEM SCH MG: 500 INJECTION, POWDER, FOR SOLUTION INTRAVENOUS at 09:07

## 2020-06-19 RX ADMIN — LINEZOLID SCH MLS/HR: 600 INJECTION, SOLUTION INTRAVENOUS at 22:17

## 2020-06-19 RX ADMIN — SODIUM CHLORIDE SCH UNIT: 9 INJECTION, SOLUTION INTRAVENOUS at 06:49

## 2020-06-19 RX ADMIN — SODIUM CHLORIDE SCH UNIT: 9 INJECTION, SOLUTION INTRAVENOUS at 21:00

## 2020-06-19 RX ADMIN — IPRATROPIUM BROMIDE AND ALBUTEROL SULFATE PRN UDVIAL: .5; 3 SOLUTION RESPIRATORY (INHALATION) at 06:11

## 2020-06-19 RX ADMIN — MEROPENEM SCH MG: 500 INJECTION, POWDER, FOR SOLUTION INTRAVENOUS at 17:50

## 2020-06-19 RX ADMIN — SODIUM CHLORIDE SCH UNIT: 9 INJECTION, SOLUTION INTRAVENOUS at 16:30

## 2020-06-19 RX ADMIN — LINEZOLID SCH MLS/HR: 600 INJECTION, SOLUTION INTRAVENOUS at 09:07

## 2020-06-19 RX ADMIN — ACETYLCYSTEINE SCH MG: 100 SOLUTION ORAL; RESPIRATORY (INHALATION) at 00:28

## 2020-06-19 RX ADMIN — ACETYLCYSTEINE SCH MG: 100 SOLUTION ORAL; RESPIRATORY (INHALATION) at 23:20

## 2020-06-19 RX ADMIN — FAMOTIDINE SCH MG: 10 INJECTION INTRAVENOUS at 09:07

## 2020-06-19 RX ADMIN — IPRATROPIUM BROMIDE AND ALBUTEROL SULFATE PRN UDVIAL: .5; 3 SOLUTION RESPIRATORY (INHALATION) at 23:20

## 2020-06-19 RX ADMIN — FAMOTIDINE SCH MG: 10 INJECTION INTRAVENOUS at 21:17

## 2020-06-19 NOTE — NUR
FOLLOW UP COMPLETED.



SLP COORDINATED WITH NURSE LORENA ANDUJAR. Pt TOLERATING DIET RECOMMENDATIONS WITH NO S/S OF 
ASPIRATION AT THIS TIME. CONTINUE WITH PLAN OF CARE AS TOLERATED.


-------------------------------------------------------------------------------

Addendum: 06/19/20 at [address] CINTHIA ANDERSON

-------------------------------------------------------------------------------

Amended: Links added.

## 2020-06-20 VITALS
TEMPERATURE: 98.1 F | DIASTOLIC BLOOD PRESSURE: 60 MMHG | RESPIRATION RATE: 24 BRPM | SYSTOLIC BLOOD PRESSURE: 107 MMHG | HEART RATE: 102 BPM

## 2020-06-20 VITALS
HEART RATE: 111 BPM | RESPIRATION RATE: 24 BRPM | TEMPERATURE: 98.9 F | SYSTOLIC BLOOD PRESSURE: 92 MMHG | DIASTOLIC BLOOD PRESSURE: 51 MMHG

## 2020-06-20 VITALS
DIASTOLIC BLOOD PRESSURE: 67 MMHG | TEMPERATURE: 97.9 F | RESPIRATION RATE: 22 BRPM | HEART RATE: 95 BPM | SYSTOLIC BLOOD PRESSURE: 115 MMHG

## 2020-06-20 VITALS
HEART RATE: 108 BPM | SYSTOLIC BLOOD PRESSURE: 142 MMHG | DIASTOLIC BLOOD PRESSURE: 108 MMHG | RESPIRATION RATE: 18 BRPM | TEMPERATURE: 99.5 F

## 2020-06-20 VITALS
TEMPERATURE: 99.4 F | SYSTOLIC BLOOD PRESSURE: 109 MMHG | RESPIRATION RATE: 22 BRPM | HEART RATE: 111 BPM | DIASTOLIC BLOOD PRESSURE: 53 MMHG

## 2020-06-20 VITALS — HEART RATE: 101 BPM | RESPIRATION RATE: 18 BRPM

## 2020-06-20 VITALS
TEMPERATURE: 98.9 F | SYSTOLIC BLOOD PRESSURE: 137 MMHG | DIASTOLIC BLOOD PRESSURE: 86 MMHG | HEART RATE: 102 BPM | RESPIRATION RATE: 18 BRPM

## 2020-06-20 VITALS — RESPIRATION RATE: 19 BRPM | HEART RATE: 96 BPM

## 2020-06-20 VITALS — RESPIRATION RATE: 20 BRPM | HEART RATE: 66 BPM

## 2020-06-20 VITALS
SYSTOLIC BLOOD PRESSURE: 129 MMHG | RESPIRATION RATE: 20 BRPM | TEMPERATURE: 97.5 F | HEART RATE: 105 BPM | DIASTOLIC BLOOD PRESSURE: 113 MMHG

## 2020-06-20 VITALS — RESPIRATION RATE: 18 BRPM | HEART RATE: 101 BPM

## 2020-06-20 VITALS — HEART RATE: 100 BPM | RESPIRATION RATE: 22 BRPM

## 2020-06-20 VITALS — HEART RATE: 93 BPM | RESPIRATION RATE: 18 BRPM

## 2020-06-20 PROCEDURE — 5A09357 ASSISTANCE WITH RESPIRATORY VENTILATION, LESS THAN 24 CONSECUTIVE HOURS, CONTINUOUS POSITIVE AIRWAY PRESSURE: ICD-10-PCS | Performed by: INTERNAL MEDICINE

## 2020-06-20 RX ADMIN — LINEZOLID SCH MLS/HR: 600 INJECTION, SOLUTION INTRAVENOUS at 21:10

## 2020-06-20 RX ADMIN — SODIUM CHLORIDE SCH UNIT: 9 INJECTION, SOLUTION INTRAVENOUS at 06:23

## 2020-06-20 RX ADMIN — FAMOTIDINE SCH MG: 10 INJECTION INTRAVENOUS at 09:48

## 2020-06-20 RX ADMIN — IPRATROPIUM BROMIDE AND ALBUTEROL SULFATE PRN UDVIAL: .5; 3 SOLUTION RESPIRATORY (INHALATION) at 18:41

## 2020-06-20 RX ADMIN — IPRATROPIUM BROMIDE AND ALBUTEROL SULFATE PRN UDVIAL: .5; 3 SOLUTION RESPIRATORY (INHALATION) at 06:23

## 2020-06-20 RX ADMIN — MEROPENEM SCH MG: 500 INJECTION, POWDER, FOR SOLUTION INTRAVENOUS at 09:48

## 2020-06-20 RX ADMIN — HEPARIN SODIUM SCH UNIT: 5000 INJECTION, SOLUTION INTRAVENOUS; SUBCUTANEOUS at 23:26

## 2020-06-20 RX ADMIN — HEPARIN SODIUM SCH UNIT: 5000 INJECTION, SOLUTION INTRAVENOUS; SUBCUTANEOUS at 09:53

## 2020-06-20 RX ADMIN — SODIUM CHLORIDE SCH UNIT: 9 INJECTION, SOLUTION INTRAVENOUS at 16:30

## 2020-06-20 RX ADMIN — SODIUM CHLORIDE SCH UNIT: 9 INJECTION, SOLUTION INTRAVENOUS at 20:45

## 2020-06-20 RX ADMIN — LINEZOLID SCH MLS/HR: 600 INJECTION, SOLUTION INTRAVENOUS at 11:23

## 2020-06-20 RX ADMIN — ACETYLCYSTEINE SCH MG: 100 SOLUTION ORAL; RESPIRATORY (INHALATION) at 11:38

## 2020-06-20 RX ADMIN — FUROSEMIDE SCH MG: 10 INJECTION, SOLUTION INTRAMUSCULAR; INTRAVENOUS at 16:36

## 2020-06-20 RX ADMIN — IPRATROPIUM BROMIDE AND ALBUTEROL SULFATE PRN UDVIAL: .5; 3 SOLUTION RESPIRATORY (INHALATION) at 11:38

## 2020-06-20 RX ADMIN — MEROPENEM SCH MG: 500 INJECTION, POWDER, FOR SOLUTION INTRAVENOUS at 16:36

## 2020-06-20 RX ADMIN — MEROPENEM SCH MG: 500 INJECTION, POWDER, FOR SOLUTION INTRAVENOUS at 01:14

## 2020-06-20 RX ADMIN — ACETYLCYSTEINE SCH MG: 100 SOLUTION ORAL; RESPIRATORY (INHALATION) at 06:22

## 2020-06-20 RX ADMIN — SODIUM CHLORIDE SCH UNIT: 9 INJECTION, SOLUTION INTRAVENOUS at 11:25

## 2020-06-20 RX ADMIN — FAMOTIDINE SCH MG: 10 INJECTION INTRAVENOUS at 21:10

## 2020-06-20 NOTE — NUR
PT O2 83%. PT IS ALERT. STATES SHE FEELS FINE. PT WAS PLACED IN HIGH FOWLERS. OXYGEN 
INCREASED TO 5LPM. O2 VERIFIED ON HAND FINGERS AND EAR. RT CALLED. RABIA RT ASSESSED PT. 
PLACED ON VENTI MASK FOR COMFORT. PT IS NOW AT 97% O2. PT STATES NO SOB OR CONCERNS. SHE 
SAYS SHE WANTS TO BE LEFT TO SLEEP. CONTINUOUS O2 MONITORING IN PLACE. NO DISTRESS NOTED.

## 2020-06-20 NOTE — NUR
PT WOKE UP CONFUSED. IS DEMANDING SHE BE TRANSFERRED TO ROOM 201. EXPLAINED THAT SHE IS NOW 
IN 4TH FLOOR. AND 2N FLOOR IS A COVID UNIT. INSISTS ON BEING TRANSFERRED. GIVES NO REASON AS 
TO WHY.PT THEN UNDERSTOOD CANNOT BE SENT OUT.

## 2020-06-21 VITALS
DIASTOLIC BLOOD PRESSURE: 95 MMHG | HEART RATE: 106 BPM | RESPIRATION RATE: 18 BRPM | SYSTOLIC BLOOD PRESSURE: 124 MMHG | TEMPERATURE: 98 F

## 2020-06-21 VITALS
RESPIRATION RATE: 20 BRPM | HEART RATE: 100 BPM | SYSTOLIC BLOOD PRESSURE: 142 MMHG | TEMPERATURE: 97.6 F | DIASTOLIC BLOOD PRESSURE: 71 MMHG

## 2020-06-21 VITALS — HEART RATE: 94 BPM | RESPIRATION RATE: 21 BRPM

## 2020-06-21 VITALS
SYSTOLIC BLOOD PRESSURE: 138 MMHG | TEMPERATURE: 99.2 F | DIASTOLIC BLOOD PRESSURE: 71 MMHG | RESPIRATION RATE: 18 BRPM | HEART RATE: 102 BPM

## 2020-06-21 VITALS — RESPIRATION RATE: 18 BRPM | HEART RATE: 102 BPM

## 2020-06-21 VITALS
DIASTOLIC BLOOD PRESSURE: 67 MMHG | HEART RATE: 105 BPM | SYSTOLIC BLOOD PRESSURE: 125 MMHG | TEMPERATURE: 97.5 F | RESPIRATION RATE: 18 BRPM

## 2020-06-21 VITALS — HEART RATE: 101 BPM | RESPIRATION RATE: 18 BRPM

## 2020-06-21 VITALS
RESPIRATION RATE: 18 BRPM | TEMPERATURE: 97.9 F | HEART RATE: 89 BPM | DIASTOLIC BLOOD PRESSURE: 50 MMHG | SYSTOLIC BLOOD PRESSURE: 107 MMHG

## 2020-06-21 VITALS — HEART RATE: 95 BPM | RESPIRATION RATE: 18 BRPM

## 2020-06-21 VITALS — HEART RATE: 104 BPM | RESPIRATION RATE: 18 BRPM

## 2020-06-21 VITALS — HEART RATE: 102 BPM | RESPIRATION RATE: 18 BRPM

## 2020-06-21 VITALS — RESPIRATION RATE: 20 BRPM | HEART RATE: 105 BPM

## 2020-06-21 PROCEDURE — 5A09357 ASSISTANCE WITH RESPIRATORY VENTILATION, LESS THAN 24 CONSECUTIVE HOURS, CONTINUOUS POSITIVE AIRWAY PRESSURE: ICD-10-PCS | Performed by: INTERNAL MEDICINE

## 2020-06-21 RX ADMIN — FUROSEMIDE SCH MG: 10 INJECTION, SOLUTION INTRAMUSCULAR; INTRAVENOUS at 02:45

## 2020-06-21 RX ADMIN — HEPARIN SODIUM SCH UNIT: 5000 INJECTION, SOLUTION INTRAVENOUS; SUBCUTANEOUS at 22:11

## 2020-06-21 RX ADMIN — FAMOTIDINE SCH MG: 10 INJECTION INTRAVENOUS at 22:08

## 2020-06-21 RX ADMIN — HEPARIN SODIUM SCH UNIT: 5000 INJECTION, SOLUTION INTRAVENOUS; SUBCUTANEOUS at 09:38

## 2020-06-21 RX ADMIN — FUROSEMIDE SCH MG: 10 INJECTION, SOLUTION INTRAMUSCULAR; INTRAVENOUS at 13:56

## 2020-06-21 RX ADMIN — IPRATROPIUM BROMIDE AND ALBUTEROL SULFATE PRN UDVIAL: .5; 3 SOLUTION RESPIRATORY (INHALATION) at 17:55

## 2020-06-21 RX ADMIN — SODIUM CHLORIDE SCH UNIT: 9 INJECTION, SOLUTION INTRAVENOUS at 11:13

## 2020-06-21 RX ADMIN — LINEZOLID SCH MLS/HR: 600 INJECTION, SOLUTION INTRAVENOUS at 22:08

## 2020-06-21 RX ADMIN — MEROPENEM SCH MG: 500 INJECTION, POWDER, FOR SOLUTION INTRAVENOUS at 09:31

## 2020-06-21 RX ADMIN — LINEZOLID SCH MLS/HR: 600 INJECTION, SOLUTION INTRAVENOUS at 09:32

## 2020-06-21 RX ADMIN — FAMOTIDINE SCH MG: 10 INJECTION INTRAVENOUS at 09:31

## 2020-06-21 RX ADMIN — FLUCONAZOLE SCH MG: 100 TABLET ORAL at 13:55

## 2020-06-21 RX ADMIN — SODIUM CHLORIDE SCH UNIT: 9 INJECTION, SOLUTION INTRAVENOUS at 07:30

## 2020-06-21 RX ADMIN — SODIUM CHLORIDE SCH UNIT: 9 INJECTION, SOLUTION INTRAVENOUS at 16:30

## 2020-06-21 RX ADMIN — IPRATROPIUM BROMIDE AND ALBUTEROL SULFATE PRN UDVIAL: .5; 3 SOLUTION RESPIRATORY (INHALATION) at 11:16

## 2020-06-21 RX ADMIN — SODIUM CHLORIDE SCH UNIT: 9 INJECTION, SOLUTION INTRAVENOUS at 21:00

## 2020-06-21 RX ADMIN — IPRATROPIUM BROMIDE AND ALBUTEROL SULFATE PRN UDVIAL: .5; 3 SOLUTION RESPIRATORY (INHALATION) at 06:06

## 2020-06-21 RX ADMIN — MEROPENEM SCH MG: 500 INJECTION, POWDER, FOR SOLUTION INTRAVENOUS at 16:54

## 2020-06-21 RX ADMIN — MEROPENEM SCH MG: 500 INJECTION, POWDER, FOR SOLUTION INTRAVENOUS at 00:35

## 2020-06-21 RX ADMIN — IPRATROPIUM BROMIDE AND ALBUTEROL SULFATE PRN UDVIAL: .5; 3 SOLUTION RESPIRATORY (INHALATION) at 23:34

## 2020-06-22 VITALS
TEMPERATURE: 98.4 F | DIASTOLIC BLOOD PRESSURE: 95 MMHG | SYSTOLIC BLOOD PRESSURE: 115 MMHG | HEART RATE: 96 BPM | RESPIRATION RATE: 19 BRPM

## 2020-06-22 VITALS
DIASTOLIC BLOOD PRESSURE: 70 MMHG | SYSTOLIC BLOOD PRESSURE: 141 MMHG | HEART RATE: 101 BPM | RESPIRATION RATE: 20 BRPM | TEMPERATURE: 97.5 F

## 2020-06-22 VITALS
HEART RATE: 98 BPM | DIASTOLIC BLOOD PRESSURE: 70 MMHG | RESPIRATION RATE: 22 BRPM | SYSTOLIC BLOOD PRESSURE: 125 MMHG | TEMPERATURE: 98 F

## 2020-06-22 VITALS — HEART RATE: 90 BPM | RESPIRATION RATE: 24 BRPM

## 2020-06-22 VITALS
RESPIRATION RATE: 19 BRPM | TEMPERATURE: 97.5 F | HEART RATE: 100 BPM | SYSTOLIC BLOOD PRESSURE: 132 MMHG | DIASTOLIC BLOOD PRESSURE: 56 MMHG

## 2020-06-22 VITALS
HEART RATE: 101 BPM | SYSTOLIC BLOOD PRESSURE: 155 MMHG | TEMPERATURE: 97.7 F | DIASTOLIC BLOOD PRESSURE: 73 MMHG | RESPIRATION RATE: 16 BRPM

## 2020-06-22 VITALS — HEART RATE: 102 BPM | RESPIRATION RATE: 18 BRPM

## 2020-06-22 VITALS — RESPIRATION RATE: 18 BRPM | HEART RATE: 104 BPM

## 2020-06-22 VITALS
HEART RATE: 98 BPM | DIASTOLIC BLOOD PRESSURE: 59 MMHG | RESPIRATION RATE: 18 BRPM | SYSTOLIC BLOOD PRESSURE: 126 MMHG | TEMPERATURE: 97.5 F

## 2020-06-22 RX ADMIN — FUROSEMIDE SCH MG: 10 INJECTION, SOLUTION INTRAMUSCULAR; INTRAVENOUS at 01:42

## 2020-06-22 RX ADMIN — HEPARIN SODIUM SCH UNIT: 5000 INJECTION, SOLUTION INTRAVENOUS; SUBCUTANEOUS at 23:10

## 2020-06-22 RX ADMIN — SODIUM CHLORIDE SCH UNIT: 9 INJECTION, SOLUTION INTRAVENOUS at 16:30

## 2020-06-22 RX ADMIN — MEROPENEM SCH MG: 500 INJECTION, POWDER, FOR SOLUTION INTRAVENOUS at 09:22

## 2020-06-22 RX ADMIN — HEPARIN SODIUM SCH UNIT: 5000 INJECTION, SOLUTION INTRAVENOUS; SUBCUTANEOUS at 09:21

## 2020-06-22 RX ADMIN — SODIUM CHLORIDE SCH UNIT: 9 INJECTION, SOLUTION INTRAVENOUS at 06:39

## 2020-06-22 RX ADMIN — FLUCONAZOLE SCH MG: 100 TABLET ORAL at 09:22

## 2020-06-22 RX ADMIN — IPRATROPIUM BROMIDE AND ALBUTEROL SULFATE PRN UDVIAL: .5; 3 SOLUTION RESPIRATORY (INHALATION) at 06:27

## 2020-06-22 RX ADMIN — FAMOTIDINE SCH MG: 10 INJECTION INTRAVENOUS at 20:10

## 2020-06-22 RX ADMIN — SODIUM CHLORIDE SCH UNIT: 9 INJECTION, SOLUTION INTRAVENOUS at 20:20

## 2020-06-22 RX ADMIN — FUROSEMIDE SCH MG: 10 INJECTION INTRAVENOUS at 20:10

## 2020-06-22 RX ADMIN — SODIUM CHLORIDE SCH UNIT: 9 INJECTION, SOLUTION INTRAVENOUS at 11:30

## 2020-06-22 RX ADMIN — LINEZOLID SCH MLS/HR: 600 INJECTION, SOLUTION INTRAVENOUS at 09:48

## 2020-06-22 RX ADMIN — IPRATROPIUM BROMIDE AND ALBUTEROL SULFATE PRN UDVIAL: .5; 3 SOLUTION RESPIRATORY (INHALATION) at 11:00

## 2020-06-22 RX ADMIN — MEROPENEM SCH MG: 500 INJECTION, POWDER, FOR SOLUTION INTRAVENOUS at 17:01

## 2020-06-22 RX ADMIN — MEROPENEM SCH MG: 500 INJECTION, POWDER, FOR SOLUTION INTRAVENOUS at 01:42

## 2020-06-22 RX ADMIN — LINEZOLID SCH MLS/HR: 600 INJECTION, SOLUTION INTRAVENOUS at 20:09

## 2020-06-22 RX ADMIN — FAMOTIDINE SCH MG: 10 INJECTION INTRAVENOUS at 09:22

## 2020-06-22 RX ADMIN — IPRATROPIUM BROMIDE AND ALBUTEROL SULFATE PRN UDVIAL: .5; 3 SOLUTION RESPIRATORY (INHALATION) at 19:05

## 2020-06-22 NOTE — NUR
TREATMENT COMPLETED. 



MEAL OBSERVATION COMPLETED. Pt TOLERATING PUREED, THIN LIQUIDS. SLP REVIEWED SAFE SWALLOW 
PRECAUTIONS WITH MINIMUM CUES. Pt INDEPENDENTLY FEEDING AT THIS TIME. NO OVERT S/S OF 
ASPIRATION DURING THE MEAL. RECOMMEND CONTINUED PUREED, THIN LIQUIDS A THIS TIME. SLP 
COORDINATED WITH NURSE VILLARREAL AT THIS TIME. 

-------------------------------------------------------------------------------

Addendum: 06/22/20 at 1332 by DIANE KIM, [facility] ST

-------------------------------------------------------------------------------

Amended: Links added.

## 2020-06-22 NOTE — NUR
CM NOTE/CRISTINE

MJ, PRATIK BAUER, CALLED IN REGARDS TO DC PLANNING. AS PER MJ, OK TO RETURN TO 
[facility] ONCE MEDICALLY CLEARED. CRISTINE FILLED FOR [facility]. 
PENDING MD ORDER FOR SNF RECOMMENDATIONS, PRIMARY NURSE, PHIL CARPENTER, AWARE.

## 2020-06-22 NOTE — NUR
RD FOLLOW UP 



Pt tolerating current diet order with no report of GI distress. Fair PO intake and able to 
eat independently. 

Pt the tachypnea as per EMR. CBW decrease to 66kg. 



Recommend 60mL ProMod QD

Recommend 500mg Vitamin C BID



RD to continue to monitor. Please notify RD as additional nutrition concerns arise. Thank 
you.

-------------------------------------------------------------------------------

Addendum: 06/22/20 at 1504 by MAMIE SAN RD RD

-------------------------------------------------------------------------------

Amended: Links added.

## 2020-06-23 VITALS
SYSTOLIC BLOOD PRESSURE: 179 MMHG | RESPIRATION RATE: 18 BRPM | TEMPERATURE: 98.7 F | DIASTOLIC BLOOD PRESSURE: 98 MMHG | HEART RATE: 95 BPM

## 2020-06-23 VITALS
SYSTOLIC BLOOD PRESSURE: 147 MMHG | DIASTOLIC BLOOD PRESSURE: 62 MMHG | HEART RATE: 91 BPM | RESPIRATION RATE: 18 BRPM | TEMPERATURE: 98.4 F

## 2020-06-23 VITALS
RESPIRATION RATE: 16 BRPM | DIASTOLIC BLOOD PRESSURE: 81 MMHG | TEMPERATURE: 98.5 F | SYSTOLIC BLOOD PRESSURE: 151 MMHG | HEART RATE: 85 BPM

## 2020-06-23 VITALS — RESPIRATION RATE: 26 BRPM | HEART RATE: 90 BPM

## 2020-06-23 VITALS
RESPIRATION RATE: 19 BRPM | DIASTOLIC BLOOD PRESSURE: 67 MMHG | TEMPERATURE: 98.6 F | HEART RATE: 102 BPM | SYSTOLIC BLOOD PRESSURE: 125 MMHG

## 2020-06-23 VITALS
TEMPERATURE: 98.9 F | HEART RATE: 93 BPM | SYSTOLIC BLOOD PRESSURE: 135 MMHG | RESPIRATION RATE: 20 BRPM | DIASTOLIC BLOOD PRESSURE: 70 MMHG

## 2020-06-23 VITALS — RESPIRATION RATE: 18 BRPM | HEART RATE: 97 BPM

## 2020-06-23 VITALS — HEART RATE: 98 BPM | RESPIRATION RATE: 28 BRPM

## 2020-06-23 VITALS
TEMPERATURE: 98.9 F | HEART RATE: 95 BPM | DIASTOLIC BLOOD PRESSURE: 79 MMHG | RESPIRATION RATE: 19 BRPM | SYSTOLIC BLOOD PRESSURE: 148 MMHG

## 2020-06-23 VITALS
RESPIRATION RATE: 22 BRPM | TEMPERATURE: 97.6 F | HEART RATE: 98 BPM | DIASTOLIC BLOOD PRESSURE: 103 MMHG | SYSTOLIC BLOOD PRESSURE: 141 MMHG

## 2020-06-23 VITALS — RESPIRATION RATE: 20 BRPM | HEART RATE: 100 BPM

## 2020-06-23 VITALS — RESPIRATION RATE: 20 BRPM | HEART RATE: 91 BPM

## 2020-06-23 VITALS — RESPIRATION RATE: 22 BRPM | HEART RATE: 98 BPM

## 2020-06-23 VITALS — HEART RATE: 92 BPM | RESPIRATION RATE: 36 BRPM

## 2020-06-23 VITALS — RESPIRATION RATE: 20 BRPM | HEART RATE: 97 BPM

## 2020-06-23 VITALS — HEART RATE: 102 BPM | RESPIRATION RATE: 22 BRPM

## 2020-06-23 PROCEDURE — 5A09357 ASSISTANCE WITH RESPIRATORY VENTILATION, LESS THAN 24 CONSECUTIVE HOURS, CONTINUOUS POSITIVE AIRWAY PRESSURE: ICD-10-PCS | Performed by: INTERNAL MEDICINE

## 2020-06-23 RX ADMIN — MEROPENEM SCH MG: 500 INJECTION, POWDER, FOR SOLUTION INTRAVENOUS at 02:13

## 2020-06-23 RX ADMIN — SODIUM CHLORIDE SCH ML: 9 INJECTION, SOLUTION INTRAMUSCULAR; INTRAVENOUS; SUBCUTANEOUS at 21:21

## 2020-06-23 RX ADMIN — SODIUM CHLORIDE SCH UNIT: 9 INJECTION, SOLUTION INTRAVENOUS at 07:30

## 2020-06-23 RX ADMIN — SODIUM CHLORIDE SCH UNIT: 9 INJECTION, SOLUTION INTRAVENOUS at 11:30

## 2020-06-23 RX ADMIN — MEROPENEM SCH MG: 500 INJECTION, POWDER, FOR SOLUTION INTRAVENOUS at 17:57

## 2020-06-23 RX ADMIN — SODIUM CHLORIDE SCH UNIT: 9 INJECTION, SOLUTION INTRAVENOUS at 16:30

## 2020-06-23 RX ADMIN — IPRATROPIUM BROMIDE AND ALBUTEROL SULFATE PRN UDVIAL: .5; 3 SOLUTION RESPIRATORY (INHALATION) at 11:05

## 2020-06-23 RX ADMIN — SODIUM CHLORIDE SCH UNIT: 9 INJECTION, SOLUTION INTRAVENOUS at 21:00

## 2020-06-23 RX ADMIN — IPRATROPIUM BROMIDE AND ALBUTEROL SULFATE PRN UDVIAL: .5; 3 SOLUTION RESPIRATORY (INHALATION) at 00:28

## 2020-06-23 RX ADMIN — MEROPENEM SCH MG: 500 INJECTION, POWDER, FOR SOLUTION INTRAVENOUS at 09:59

## 2020-06-23 RX ADMIN — IPRATROPIUM BROMIDE AND ALBUTEROL SULFATE PRN UDVIAL: .5; 3 SOLUTION RESPIRATORY (INHALATION) at 23:12

## 2020-06-23 RX ADMIN — FAMOTIDINE SCH MG: 10 INJECTION INTRAVENOUS at 21:20

## 2020-06-23 RX ADMIN — LINEZOLID SCH MLS/HR: 600 INJECTION, SOLUTION INTRAVENOUS at 09:59

## 2020-06-23 RX ADMIN — FUROSEMIDE SCH MG: 10 INJECTION INTRAVENOUS at 10:00

## 2020-06-23 RX ADMIN — IPRATROPIUM BROMIDE AND ALBUTEROL SULFATE PRN UDVIAL: .5; 3 SOLUTION RESPIRATORY (INHALATION) at 18:15

## 2020-06-23 RX ADMIN — FUROSEMIDE SCH MG: 10 INJECTION INTRAVENOUS at 21:20

## 2020-06-23 RX ADMIN — IPRATROPIUM BROMIDE AND ALBUTEROL SULFATE PRN UDVIAL: .5; 3 SOLUTION RESPIRATORY (INHALATION) at 06:32

## 2020-06-23 RX ADMIN — HEPARIN SODIUM SCH UNIT: 5000 INJECTION, SOLUTION INTRAVENOUS; SUBCUTANEOUS at 21:22

## 2020-06-23 RX ADMIN — FLUCONAZOLE SCH MG: 100 TABLET ORAL at 10:03

## 2020-06-23 RX ADMIN — LINEZOLID SCH MLS/HR: 600 INJECTION, SOLUTION INTRAVENOUS at 21:20

## 2020-06-23 RX ADMIN — FAMOTIDINE SCH MG: 10 INJECTION INTRAVENOUS at 10:00

## 2020-06-23 RX ADMIN — SODIUM CHLORIDE SCH ML: 9 INJECTION, SOLUTION INTRAMUSCULAR; INTRAVENOUS; SUBCUTANEOUS at 21:20

## 2020-06-23 RX ADMIN — HEPARIN SODIUM SCH UNIT: 5000 INJECTION, SOLUTION INTRAVENOUS; SUBCUTANEOUS at 10:05

## 2020-06-23 NOTE — NUR
HOLD TREATMENT



Pt ON BIPAP AT THIS TIME. NO FOOD OR LIQUID PROVIDED. SLP COORDINATED WITH NURSE JOHNSON. SLP 
WILL CONTINUE TO FOLLOW Pt.

-------------------------------------------------------------------------------

Addendum: 06/23/20 at 1149 by DIANE KIM, SPT ST

-------------------------------------------------------------------------------

Amended: Links added.

## 2020-06-23 NOTE — NUR
O2

RT, CHARANJIT, IN TO GIVE BREATHING TREATMENT AND WAS ABOUT TO PLACE PT ON BIPAP BUT PT GOT MAD 
AND REFUSED BIPAP. KEPT PT ON O2 AT 4LPM VIA NC. O2 SAT=97% AT THIS TIME.

## 2020-06-23 NOTE — NUR
PT AAO X 3 , REVIEW PLAN OF CARE,  HOB UP. ON 4 LITER NC,  PT  AT TIME  GET CONFUSED 
REGARDING CARE AND ENVIROMENT . .SETUP FOR BREAKFAST .     RISK OF ASPIRATION AND FALL RISK 
BED ALARMS ON . PT HAS A SPECIAL BED ,  FOR  CARE.

## 2020-06-23 NOTE — NUR
CM NOTE/[facility] NOT IN NETWORK

NEW ORDER FOR [facility] FROM [facility]. KRIS BARBOSA FNP MADE AWARE THAT LT[facility] IS NOT IN 
NETWORK WITH MEDICAID [facility]. PRIMARY NURSE, ALEX CARPENTER, ALSO MADE AWARE. AS PER 
FNP, WILL SPEAK TO MD REGARDING NEW DC RECOMMENDATIONS.

## 2020-06-23 NOTE — NUR
MEDS

SHIFT ASSESSMENT DONE, PLEASE REFER TO CHART. PT IS VERY SLEEPY BUR OPENS HER EYES  AND 
ACKNOWLEDGES WRITER. DUE MEDS ADMINISTERED, TOLERATED WELL. KEPT ON SEMI-WOOD'S POSITION. 
O2 AT 4LPM VIA NC WITH O2 SAT=95%. KEPT COMFORTABLE. WILL MONITOR PT. 

-------------------------------------------------------------------------------

Addendum: 06/24/20 at 0007 by GRECIA FALCON RN RN

-------------------------------------------------------------------------------

Amended: Links added.

## 2020-06-23 NOTE — NUR
PT ABG'S . RESULTS AS  FOLLOW , AT 4 LITER NC PH OF 7.48

PCOS  46.8

 PO2 OF 46.8 HCO3  39.3     O2 SAT OF   84.9   . PT WAS PLACED SHELDON\CK ON  HER  BIPAP MASK,  
.  PLACEMENT WAS AGREE PER PT. AND EDUCATIONS OF TODAY ABG'S RESULTS AND   BIPAP.  MASK 
PLACEMENT   HOB UP    RR OF 20'S    O2 SAT OF 94%

## 2020-06-24 VITALS — HEART RATE: 110 BPM | RESPIRATION RATE: 20 BRPM

## 2020-06-24 VITALS
HEART RATE: 101 BPM | TEMPERATURE: 97.6 F | DIASTOLIC BLOOD PRESSURE: 89 MMHG | RESPIRATION RATE: 20 BRPM | SYSTOLIC BLOOD PRESSURE: 139 MMHG

## 2020-06-24 VITALS
TEMPERATURE: 98 F | SYSTOLIC BLOOD PRESSURE: 171 MMHG | DIASTOLIC BLOOD PRESSURE: 95 MMHG | RESPIRATION RATE: 18 BRPM | HEART RATE: 96 BPM

## 2020-06-24 VITALS — RESPIRATION RATE: 22 BRPM | HEART RATE: 106 BPM

## 2020-06-24 VITALS
TEMPERATURE: 98.7 F | HEART RATE: 100 BPM | RESPIRATION RATE: 20 BRPM | SYSTOLIC BLOOD PRESSURE: 136 MMHG | DIASTOLIC BLOOD PRESSURE: 93 MMHG

## 2020-06-24 VITALS — RESPIRATION RATE: 20 BRPM | HEART RATE: 102 BPM

## 2020-06-24 VITALS
RESPIRATION RATE: 19 BRPM | TEMPERATURE: 98.4 F | SYSTOLIC BLOOD PRESSURE: 148 MMHG | DIASTOLIC BLOOD PRESSURE: 100 MMHG | HEART RATE: 90 BPM

## 2020-06-24 VITALS — RESPIRATION RATE: 22 BRPM | HEART RATE: 113 BPM

## 2020-06-24 VITALS — RESPIRATION RATE: 22 BRPM | HEART RATE: 102 BPM

## 2020-06-24 RX ADMIN — SODIUM CHLORIDE SCH UNIT: 9 INJECTION, SOLUTION INTRAVENOUS at 20:41

## 2020-06-24 RX ADMIN — IPRATROPIUM BROMIDE AND ALBUTEROL SULFATE PRN UDVIAL: .5; 3 SOLUTION RESPIRATORY (INHALATION) at 06:23

## 2020-06-24 RX ADMIN — MEROPENEM SCH MG: 500 INJECTION, POWDER, FOR SOLUTION INTRAVENOUS at 00:38

## 2020-06-24 RX ADMIN — IPRATROPIUM BROMIDE AND ALBUTEROL SULFATE PRN UDVIAL: .5; 3 SOLUTION RESPIRATORY (INHALATION) at 17:55

## 2020-06-24 RX ADMIN — SODIUM CHLORIDE SCH UNIT: 9 INJECTION, SOLUTION INTRAVENOUS at 16:30

## 2020-06-24 RX ADMIN — MEROPENEM SCH MG: 500 INJECTION, POWDER, FOR SOLUTION INTRAVENOUS at 08:59

## 2020-06-24 RX ADMIN — HEPARIN SODIUM SCH UNIT: 5000 INJECTION, SOLUTION INTRAVENOUS; SUBCUTANEOUS at 09:22

## 2020-06-24 RX ADMIN — MEROPENEM SCH MG: 500 INJECTION, POWDER, FOR SOLUTION INTRAVENOUS at 17:38

## 2020-06-24 RX ADMIN — SODIUM CHLORIDE SCH UNIT: 9 INJECTION, SOLUTION INTRAVENOUS at 06:12

## 2020-06-24 RX ADMIN — FAMOTIDINE SCH MG: 10 INJECTION INTRAVENOUS at 20:40

## 2020-06-24 RX ADMIN — FAMOTIDINE SCH MG: 10 INJECTION INTRAVENOUS at 08:59

## 2020-06-24 RX ADMIN — FUROSEMIDE SCH MG: 10 INJECTION INTRAVENOUS at 09:09

## 2020-06-24 RX ADMIN — HEPARIN SODIUM SCH UNIT: 5000 INJECTION, SOLUTION INTRAVENOUS; SUBCUTANEOUS at 20:55

## 2020-06-24 RX ADMIN — IPRATROPIUM BROMIDE AND ALBUTEROL SULFATE PRN UDVIAL: .5; 3 SOLUTION RESPIRATORY (INHALATION) at 23:18

## 2020-06-24 RX ADMIN — SODIUM CHLORIDE SCH ML: 9 INJECTION, SOLUTION INTRAMUSCULAR; INTRAVENOUS; SUBCUTANEOUS at 10:20

## 2020-06-24 RX ADMIN — LINEZOLID SCH MLS/HR: 600 INJECTION, SOLUTION INTRAVENOUS at 10:20

## 2020-06-24 RX ADMIN — SODIUM CHLORIDE SCH ML: 9 INJECTION, SOLUTION INTRAMUSCULAR; INTRAVENOUS; SUBCUTANEOUS at 20:41

## 2020-06-24 RX ADMIN — FUROSEMIDE SCH MG: 10 INJECTION INTRAVENOUS at 20:39

## 2020-06-24 RX ADMIN — SODIUM CHLORIDE SCH ML: 9 INJECTION, SOLUTION INTRAMUSCULAR; INTRAVENOUS; SUBCUTANEOUS at 04:09

## 2020-06-24 RX ADMIN — SODIUM CHLORIDE SCH UNIT: 9 INJECTION, SOLUTION INTRAVENOUS at 11:20

## 2020-06-24 RX ADMIN — LINEZOLID SCH MLS/HR: 600 INJECTION, SOLUTION INTRAVENOUS at 20:40

## 2020-06-24 RX ADMIN — FLUCONAZOLE SCH MG: 100 TABLET ORAL at 10:19

## 2020-06-24 NOTE — NUR
FOLLOW UP



Pt EATING BREAKFAST INDEPENDENTLY AT THIS TIME WITH TRAY SET UP. Pt SEATED AT 90 DEGREES. Pt 
WITH SLOW RATE AND NO OVERT S/S OF ASPIRATION. SPO2 REMAINED ABOVE 90 DURING P.O. NO OVERT 
S/S OF ASPIRATION. NO CONCERNS FROM NURSE AT THIS TIME. SLP WILL CONTINUE TO FOLLOW Pt. 

-------------------------------------------------------------------------------

Addendum: 06/25/20 at 0822 by DIANE KIM, [facility] ST

-------------------------------------------------------------------------------

Amended: Links added.

## 2020-06-24 NOTE — NUR
ROUNDS

PT RESTING WELL, FAIRLY ASLEEP. O2 SAT MAINTAINING AT 98%. IV ANTIBIOTICS FINISHED AND 
SALINE LOCKED CENTRAL LINE. WILL MONITOR PT.

## 2020-06-24 NOTE — NUR
DRAW

CHANI RAD TECH, IN TO DO CHEST X-RAY. PT DENIES ANY CONCERNS AT THIS TIME. BLOOD DRAWN FROM 
CENTRAL LINE AND SPECIMEN GIVEN TO PHLEBOTOMIST. ALL PORTS 3 OF CENTRAL LINE FLUSHES GOOD 
WITH GOOD BLOOD RETURN. ALL PORTS OF PICC LINE ARE HARD TO FLUSH AND NO BLOOD DRAW. KEPT 
COMFORTABLE WITH HOB ELEVATED. FOR MORE CARE.

## 2020-06-24 NOTE — NUR
REFUSED

PT'S O2 SAT DROPPED TO THE 80'S. WAS ABOUT TO PLACE PT ON THE BIPAP BUT SHE WOKE UP AND 
REFUSED. PT CLAIMS SHE IS BREATHING JUST FINE. PT'S O2 SAT INCREASED TO 94% AFTER PT 
BREATHED THROUGH HER NOSE. KEPT ON 4LPM VIA NC. ORAL CARE DONE. REMINDED PT TO BREATH 
THROUGH HER NOSE AND NOT HER MOUTH. WILL MONITOR PT.

## 2020-06-24 NOTE — NUR
CM NOTE/DCP [facility]

NEW ORDER FOR RETURN TO LONG TERM CARE AT [facility]. CLINICAL PACKET FAXED 
TO [facility], PENDING CONFIRMATION RECEIVED. NON EMERGENCY TRANSFER REQUEST FAXED TO 
[employer], PENDING RESPONSE. AS PER KELTON AT [facility], WILL REVIEW CLINICAL 
PACKET. POSSIBLE DC TO LONG TERM SNF TODAY AS PER FNP FOR [facility].

## 2020-06-25 VITALS — RESPIRATION RATE: 20 BRPM | HEART RATE: 90 BPM

## 2020-06-25 VITALS — RESPIRATION RATE: 28 BRPM | HEART RATE: 98 BPM

## 2020-06-25 VITALS
HEART RATE: 91 BPM | SYSTOLIC BLOOD PRESSURE: 129 MMHG | DIASTOLIC BLOOD PRESSURE: 71 MMHG | TEMPERATURE: 98.1 F | RESPIRATION RATE: 20 BRPM

## 2020-06-25 VITALS
SYSTOLIC BLOOD PRESSURE: 162 MMHG | RESPIRATION RATE: 20 BRPM | TEMPERATURE: 98.6 F | HEART RATE: 102 BPM | DIASTOLIC BLOOD PRESSURE: 98 MMHG

## 2020-06-25 VITALS
DIASTOLIC BLOOD PRESSURE: 77 MMHG | HEART RATE: 92 BPM | TEMPERATURE: 98.3 F | RESPIRATION RATE: 18 BRPM | SYSTOLIC BLOOD PRESSURE: 144 MMHG

## 2020-06-25 VITALS
TEMPERATURE: 98.9 F | HEART RATE: 95 BPM | DIASTOLIC BLOOD PRESSURE: 95 MMHG | SYSTOLIC BLOOD PRESSURE: 156 MMHG | RESPIRATION RATE: 18 BRPM

## 2020-06-25 VITALS — RESPIRATION RATE: 20 BRPM | HEART RATE: 89 BPM

## 2020-06-25 VITALS
DIASTOLIC BLOOD PRESSURE: 90 MMHG | SYSTOLIC BLOOD PRESSURE: 150 MMHG | HEART RATE: 108 BPM | TEMPERATURE: 97.6 F | RESPIRATION RATE: 18 BRPM

## 2020-06-25 VITALS
SYSTOLIC BLOOD PRESSURE: 154 MMHG | HEART RATE: 100 BPM | RESPIRATION RATE: 17 BRPM | DIASTOLIC BLOOD PRESSURE: 109 MMHG | TEMPERATURE: 98.3 F

## 2020-06-25 VITALS — RESPIRATION RATE: 20 BRPM | HEART RATE: 92 BPM

## 2020-06-25 VITALS — RESPIRATION RATE: 20 BRPM

## 2020-06-25 PROCEDURE — 5A09357 ASSISTANCE WITH RESPIRATORY VENTILATION, LESS THAN 24 CONSECUTIVE HOURS, CONTINUOUS POSITIVE AIRWAY PRESSURE: ICD-10-PCS | Performed by: INTERNAL MEDICINE

## 2020-06-25 RX ADMIN — SODIUM CHLORIDE SCH UNIT: 9 INJECTION, SOLUTION INTRAVENOUS at 05:55

## 2020-06-25 RX ADMIN — FAMOTIDINE SCH MG: 10 INJECTION INTRAVENOUS at 20:32

## 2020-06-25 RX ADMIN — IPRATROPIUM BROMIDE AND ALBUTEROL SULFATE PRN UDVIAL: .5; 3 SOLUTION RESPIRATORY (INHALATION) at 18:33

## 2020-06-25 RX ADMIN — MEROPENEM SCH MG: 500 INJECTION, POWDER, FOR SOLUTION INTRAVENOUS at 03:09

## 2020-06-25 RX ADMIN — FUROSEMIDE SCH MG: 10 INJECTION INTRAVENOUS at 20:32

## 2020-06-25 RX ADMIN — FUROSEMIDE SCH MG: 10 INJECTION INTRAVENOUS at 10:04

## 2020-06-25 RX ADMIN — SODIUM CHLORIDE SCH ML: 9 INJECTION, SOLUTION INTRAMUSCULAR; INTRAVENOUS; SUBCUTANEOUS at 03:10

## 2020-06-25 RX ADMIN — IPRATROPIUM BROMIDE AND ALBUTEROL SULFATE PRN UDVIAL: .5; 3 SOLUTION RESPIRATORY (INHALATION) at 06:33

## 2020-06-25 RX ADMIN — FAMOTIDINE SCH MG: 10 INJECTION INTRAVENOUS at 10:04

## 2020-06-25 RX ADMIN — FLUCONAZOLE SCH MG: 100 TABLET ORAL at 10:04

## 2020-06-25 RX ADMIN — SODIUM CHLORIDE SCH ML: 9 INJECTION, SOLUTION INTRAMUSCULAR; INTRAVENOUS; SUBCUTANEOUS at 19:45

## 2020-06-25 RX ADMIN — LINEZOLID SCH MLS/HR: 600 INJECTION, SOLUTION INTRAVENOUS at 10:03

## 2020-06-25 RX ADMIN — LINEZOLID SCH MLS/HR: 600 INJECTION, SOLUTION INTRAVENOUS at 20:30

## 2020-06-25 RX ADMIN — SODIUM CHLORIDE SCH UNIT: 9 INJECTION, SOLUTION INTRAVENOUS at 11:28

## 2020-06-25 RX ADMIN — MEROPENEM SCH MG: 500 INJECTION, POWDER, FOR SOLUTION INTRAVENOUS at 10:03

## 2020-06-25 RX ADMIN — SODIUM CHLORIDE SCH UNIT: 9 INJECTION, SOLUTION INTRAVENOUS at 20:44

## 2020-06-25 RX ADMIN — HEPARIN SODIUM SCH UNIT: 5000 INJECTION, SOLUTION INTRAVENOUS; SUBCUTANEOUS at 20:44

## 2020-06-25 RX ADMIN — HEPARIN SODIUM SCH UNIT: 5000 INJECTION, SOLUTION INTRAVENOUS; SUBCUTANEOUS at 10:22

## 2020-06-25 RX ADMIN — SODIUM CHLORIDE SCH ML: 9 INJECTION, SOLUTION INTRAMUSCULAR; INTRAVENOUS; SUBCUTANEOUS at 03:09

## 2020-06-25 RX ADMIN — IPRATROPIUM BROMIDE AND ALBUTEROL SULFATE PRN UDVIAL: .5; 3 SOLUTION RESPIRATORY (INHALATION) at 23:32

## 2020-06-25 NOTE — NUR
RD FOLLOW UP 



Pt with [facility] Healthy, Angie, 6small meal diet order in place. 60mL ProMod QD. Poor/Fair PO 
intake. LBM 6/23/20.

BUE 4+ Pitting edema. Alb 1.6.



Recommend 1200mL Fluid Restriction

Recommend Ensure QD



RD to continue to monitor. Please notify as additional nutrition concerns arise. Thank you. 

-------------------------------------------------------------------------------

Addendum: 06/25/20 at 1512 by MAMIE SAN RD RD

-------------------------------------------------------------------------------

Amended: Links added.

## 2020-06-25 NOTE — NUR
PAGELINDA BARBOSA, PT. WITH A LO`T OF WHEEZES. SOB. ON VENT MASK AT 35%. PT. HAS ORDERS 
TO BE DISCHARGED TO [facility] IN [place] AND MAY WANT TO HOLD DC UNTIL AM.

## 2020-06-25 NOTE — NUR
CM NOTE/AMARILIS HAQ

AS PER KELTON AT [facility], OK FOR RESIDENT TO RETURN TO LONG TERM CARE. 
PENDING REMOVAL OF EITHER CENTRAL LINE OR PICC LINE. DCP TO LONG TERM SNF TODAY VIA EMS, 
PENDING DC ORDERS.

## 2020-06-25 NOTE — NUR
NOTIFIED AMARILIS PT. WOULD NOT BE GOING TODAY.SPOKE TO MATTHIAS COMER, HAD GIVEN REPORT T MS. COMER EARLIER AND MED. RECORD HAS BEEN FAXED.

## 2020-06-26 VITALS — HEART RATE: 83 BPM | RESPIRATION RATE: 28 BRPM

## 2020-06-26 VITALS
SYSTOLIC BLOOD PRESSURE: 146 MMHG | HEART RATE: 81 BPM | RESPIRATION RATE: 22 BRPM | TEMPERATURE: 98.3 F | DIASTOLIC BLOOD PRESSURE: 81 MMHG

## 2020-06-26 VITALS — HEART RATE: 89 BPM | RESPIRATION RATE: 16 BRPM

## 2020-06-26 VITALS — HEART RATE: 82 BPM | RESPIRATION RATE: 21 BRPM

## 2020-06-26 VITALS
HEART RATE: 82 BPM | SYSTOLIC BLOOD PRESSURE: 142 MMHG | DIASTOLIC BLOOD PRESSURE: 60 MMHG | RESPIRATION RATE: 22 BRPM | TEMPERATURE: 98.1 F

## 2020-06-26 VITALS
SYSTOLIC BLOOD PRESSURE: 173 MMHG | RESPIRATION RATE: 22 BRPM | DIASTOLIC BLOOD PRESSURE: 69 MMHG | HEART RATE: 83 BPM | TEMPERATURE: 98.5 F

## 2020-06-26 VITALS — HEART RATE: 80 BPM | RESPIRATION RATE: 18 BRPM

## 2020-06-26 VITALS
TEMPERATURE: 97.5 F | DIASTOLIC BLOOD PRESSURE: 65 MMHG | SYSTOLIC BLOOD PRESSURE: 110 MMHG | HEART RATE: 86 BPM | RESPIRATION RATE: 22 BRPM

## 2020-06-26 PROCEDURE — 5A09357 ASSISTANCE WITH RESPIRATORY VENTILATION, LESS THAN 24 CONSECUTIVE HOURS, CONTINUOUS POSITIVE AIRWAY PRESSURE: ICD-10-PCS | Performed by: INTERNAL MEDICINE

## 2020-06-26 RX ADMIN — FLUCONAZOLE SCH MG: 100 TABLET ORAL at 08:52

## 2020-06-26 RX ADMIN — SODIUM CHLORIDE SCH UNIT: 9 INJECTION, SOLUTION INTRAVENOUS at 16:30

## 2020-06-26 RX ADMIN — SODIUM CHLORIDE SCH UNIT: 9 INJECTION, SOLUTION INTRAVENOUS at 11:30

## 2020-06-26 RX ADMIN — MEROPENEM SCH MG: 500 INJECTION, POWDER, FOR SOLUTION INTRAVENOUS at 08:51

## 2020-06-26 RX ADMIN — HEPARIN SODIUM SCH UNIT: 5000 INJECTION, SOLUTION INTRAVENOUS; SUBCUTANEOUS at 10:15

## 2020-06-26 RX ADMIN — MEROPENEM SCH MG: 500 INJECTION, POWDER, FOR SOLUTION INTRAVENOUS at 17:45

## 2020-06-26 RX ADMIN — MEROPENEM SCH MG: 500 INJECTION, POWDER, FOR SOLUTION INTRAVENOUS at 01:59

## 2020-06-26 RX ADMIN — SODIUM CHLORIDE SCH UNIT: 9 INJECTION, SOLUTION INTRAVENOUS at 05:47

## 2020-06-26 RX ADMIN — IPRATROPIUM BROMIDE AND ALBUTEROL SULFATE PRN UDVIAL: .5; 3 SOLUTION RESPIRATORY (INHALATION) at 06:24

## 2020-06-26 RX ADMIN — LINEZOLID SCH MLS/HR: 600 INJECTION, SOLUTION INTRAVENOUS at 08:51

## 2020-06-26 RX ADMIN — FUROSEMIDE SCH MG: 10 INJECTION INTRAVENOUS at 08:52

## 2020-06-26 RX ADMIN — FAMOTIDINE SCH MG: 10 INJECTION INTRAVENOUS at 08:52
